# Patient Record
Sex: FEMALE | Race: WHITE | NOT HISPANIC OR LATINO | Employment: FULL TIME | URBAN - METROPOLITAN AREA
[De-identification: names, ages, dates, MRNs, and addresses within clinical notes are randomized per-mention and may not be internally consistent; named-entity substitution may affect disease eponyms.]

---

## 2017-02-24 ENCOUNTER — TRANSCRIBE ORDERS (OUTPATIENT)
Dept: ADMINISTRATIVE | Facility: HOSPITAL | Age: 47
End: 2017-02-24

## 2017-02-24 ENCOUNTER — HOSPITAL ENCOUNTER (OUTPATIENT)
Dept: GENERAL RADIOLOGY | Facility: HOSPITAL | Age: 47
Discharge: HOME | End: 2017-02-24
Attending: FAMILY MEDICINE | Admitting: FAMILY MEDICINE

## 2017-02-24 DIAGNOSIS — M25.522 LEFT ELBOW PAIN: Primary | ICD-10-CM

## 2017-02-24 DIAGNOSIS — M25.522 LEFT ELBOW PAIN: ICD-10-CM

## 2017-02-24 DIAGNOSIS — S46.912S LEFT SHOULDER STRAIN, SEQUELA: ICD-10-CM

## 2017-02-24 PROCEDURE — 73070 X-RAY EXAM OF ELBOW: CPT

## 2017-02-24 PROCEDURE — 73030 X-RAY EXAM OF SHOULDER: CPT

## 2017-07-13 ENCOUNTER — TRANSCRIBE ORDERS (OUTPATIENT)
Dept: ADMINISTRATIVE | Facility: HOSPITAL | Age: 47
End: 2017-07-13

## 2017-07-13 DIAGNOSIS — Z12.31 VISIT FOR SCREENING MAMMOGRAM: Primary | ICD-10-CM

## 2017-07-25 ENCOUNTER — HOSPITAL ENCOUNTER (OUTPATIENT)
Dept: MAMMOGRAPHY | Facility: HOSPITAL | Age: 47
Discharge: HOME | End: 2017-07-25
Attending: FAMILY MEDICINE | Admitting: FAMILY MEDICINE

## 2017-07-25 DIAGNOSIS — Z12.31 VISIT FOR SCREENING MAMMOGRAM: ICD-10-CM

## 2017-07-25 PROCEDURE — 77067 SCR MAMMO BI INCL CAD: CPT | Performed by: RADIOLOGY

## 2017-07-25 PROCEDURE — 77063 BREAST TOMOSYNTHESIS BI: CPT | Performed by: RADIOLOGY

## 2017-07-25 PROCEDURE — 77063 BREAST TOMOSYNTHESIS BI: CPT

## 2017-07-25 PROCEDURE — G0202 SCR MAMMO BI INCL CAD: HCPCS

## 2018-03-26 ENCOUNTER — TRANSCRIBE ORDERS (OUTPATIENT)
Dept: LAB | Facility: HOSPITAL | Age: 48
End: 2018-03-26

## 2018-03-26 ENCOUNTER — LAB (OUTPATIENT)
Dept: LAB | Facility: HOSPITAL | Age: 48
End: 2018-03-26

## 2018-03-26 DIAGNOSIS — E21.0 PRIMARY HYPERPARATHYROIDISM (HCC): Primary | ICD-10-CM

## 2018-03-26 DIAGNOSIS — E21.0 PRIMARY HYPERPARATHYROIDISM (HCC): ICD-10-CM

## 2018-03-26 LAB
ALBUMIN SERPL-MCNC: 4.3 G/DL (ref 3.2–4.8)
ALBUMIN/GLOB SERPL: 2 G/DL (ref 1.5–2.5)
ALP SERPL-CCNC: 314 U/L (ref 25–100)
ALT SERPL W P-5'-P-CCNC: 27 U/L (ref 7–40)
ANION GAP SERPL CALCULATED.3IONS-SCNC: 4 MMOL/L (ref 3–11)
AST SERPL-CCNC: 19 U/L (ref 0–33)
BILIRUB SERPL-MCNC: 0.4 MG/DL (ref 0.3–1.2)
BUN BLD-MCNC: 13 MG/DL (ref 9–23)
BUN/CREAT SERPL: 16.3 (ref 7–25)
CALCIUM SPEC-SCNC: 9.2 MG/DL (ref 8.7–10.4)
CHLORIDE SERPL-SCNC: 109 MMOL/L (ref 99–109)
CO2 SERPL-SCNC: 27 MMOL/L (ref 20–31)
CREAT BLD-MCNC: 0.8 MG/DL (ref 0.6–1.3)
DEPRECATED RDW RBC AUTO: 43.7 FL (ref 37–54)
ERYTHROCYTE [DISTWIDTH] IN BLOOD BY AUTOMATED COUNT: 13 % (ref 11.3–14.5)
GFR SERPL CREATININE-BSD FRML MDRD: 76 ML/MIN/1.73
GLOBULIN UR ELPH-MCNC: 2.1 GM/DL
GLUCOSE BLD-MCNC: 111 MG/DL (ref 70–100)
HCT VFR BLD AUTO: 44 % (ref 34.5–44)
HGB BLD-MCNC: 13.6 G/DL (ref 11.5–15.5)
MCH RBC QN AUTO: 28.5 PG (ref 27–31)
MCHC RBC AUTO-ENTMCNC: 30.9 G/DL (ref 32–36)
MCV RBC AUTO: 92.1 FL (ref 80–99)
PLATELET # BLD AUTO: 255 10*3/MM3 (ref 150–450)
PMV BLD AUTO: 11.2 FL (ref 6–12)
POTASSIUM BLD-SCNC: 4.5 MMOL/L (ref 3.5–5.5)
PROT SERPL-MCNC: 6.4 G/DL (ref 5.7–8.2)
RBC # BLD AUTO: 4.78 10*6/MM3 (ref 3.89–5.14)
SODIUM BLD-SCNC: 140 MMOL/L (ref 132–146)
TSH SERPL DL<=0.05 MIU/L-ACNC: 1.44 MIU/ML (ref 0.35–5.35)
WBC NRBC COR # BLD: 7.95 10*3/MM3 (ref 3.5–10.8)

## 2018-03-26 PROCEDURE — 85027 COMPLETE CBC AUTOMATED: CPT

## 2018-03-26 PROCEDURE — 36415 COLL VENOUS BLD VENIPUNCTURE: CPT

## 2018-03-26 PROCEDURE — 80053 COMPREHEN METABOLIC PANEL: CPT

## 2018-03-26 PROCEDURE — 84443 ASSAY THYROID STIM HORMONE: CPT

## 2018-03-30 ENCOUNTER — TRANSCRIBE ORDERS (OUTPATIENT)
Dept: ADMINISTRATIVE | Facility: HOSPITAL | Age: 48
End: 2018-03-30

## 2018-03-30 DIAGNOSIS — E21.0 PRIMARY HYPERPARATHYROIDISM (HCC): Primary | ICD-10-CM

## 2018-04-10 ENCOUNTER — HOSPITAL ENCOUNTER (OUTPATIENT)
Dept: ULTRASOUND IMAGING | Facility: HOSPITAL | Age: 48
Discharge: HOME | End: 2018-04-10
Attending: SURGERY

## 2018-04-10 ENCOUNTER — APPOINTMENT (OUTPATIENT)
Dept: NUCLEAR MEDICINE | Facility: HOSPITAL | Age: 48
End: 2018-04-10
Attending: SURGERY

## 2018-04-20 ENCOUNTER — HOSPITAL ENCOUNTER (OUTPATIENT)
Dept: ULTRASOUND IMAGING | Facility: HOSPITAL | Age: 48
Discharge: HOME | End: 2018-04-20
Attending: SURGERY | Admitting: SURGERY

## 2018-04-20 ENCOUNTER — HOSPITAL ENCOUNTER (OUTPATIENT)
Dept: NUCLEAR MEDICINE | Facility: HOSPITAL | Age: 48
Discharge: HOME | End: 2018-04-20
Attending: SURGERY

## 2018-04-20 DIAGNOSIS — E21.0 PRIMARY HYPERPARATHYROIDISM (HCC): ICD-10-CM

## 2018-04-20 PROCEDURE — 78070 PARATHYROID PLANAR IMAGING: CPT

## 2018-04-20 PROCEDURE — A9500 TC99M SESTAMIBI: HCPCS | Performed by: SURGERY

## 2018-04-20 PROCEDURE — 76536 US EXAM OF HEAD AND NECK: CPT

## 2018-04-20 PROCEDURE — 0 TECHNETIUM SESTAMIBI: Performed by: SURGERY

## 2018-04-20 RX ADMIN — TECHNETIUM TC 99M SESTAMIBI 1 DOSE: 1 INJECTION INTRAVENOUS at 13:05

## 2018-05-03 ENCOUNTER — TRANSCRIBE ORDERS (OUTPATIENT)
Dept: ADMINISTRATIVE | Facility: HOSPITAL | Age: 48
End: 2018-05-03

## 2018-05-03 DIAGNOSIS — E21.0 PRIMARY HYPERPARATHYROIDISM (HCC): Primary | ICD-10-CM

## 2018-05-08 ENCOUNTER — APPOINTMENT (OUTPATIENT)
Dept: ULTRASOUND IMAGING | Facility: HOSPITAL | Age: 48
End: 2018-05-08
Attending: SURGERY

## 2018-05-10 ENCOUNTER — TELEPHONE (OUTPATIENT)
Dept: NEUROSURGERY | Facility: CLINIC | Age: 48
End: 2018-05-10

## 2018-05-14 ENCOUNTER — APPOINTMENT (OUTPATIENT)
Dept: ULTRASOUND IMAGING | Facility: HOSPITAL | Age: 48
End: 2018-05-14
Attending: SURGERY

## 2018-05-14 ENCOUNTER — OFFICE VISIT (OUTPATIENT)
Dept: NEUROSURGERY | Facility: CLINIC | Age: 48
End: 2018-05-14

## 2018-05-14 VITALS — OXYGEN SATURATION: 98 % | RESPIRATION RATE: 18 BRPM | HEIGHT: 72 IN

## 2018-05-14 DIAGNOSIS — I67.1 CEREBROVASCULAR DURAL AV FISTULA: Primary | ICD-10-CM

## 2018-05-14 PROCEDURE — 99214 OFFICE O/P EST MOD 30 MIN: CPT | Performed by: NEUROLOGICAL SURGERY

## 2018-05-14 NOTE — PROGRESS NOTES
"  NAME: JEFF SIDHU   DOS: 2018  : 1970  PCP: Suzan Whitley MD    Chief Complaint:  Follow-up pulsatile tenderness  Chief Complaint   Patient presents with   • Dural AV Fistula       History of Present Illness:  47 y.o. female     This is a lady well-known to me for a torcular arterial venous fistula is undergone embolization on 2 separate occasions with reduction of the right-sided temporal pulsatile tinnitus but now she's had recurrence of left-sided symptomatology.  I set her up for a follow-up diagnostic angiogram a couple of years back however she was lost to follow-up given her \"busy life \".  She presented with a history of hyperparathyroidism was scheduled to undergo surgery for re-presented and they wish to get clearance for surgery and center back to me she still having pulsatile symptoms she has a significant dural fistula with retrograde venous drainage but has had no signs of hemorrhage.  PMHX  Allergies:  Allergies   Allergen Reactions   • Asa [Aspirin]      Irritates stomach     Medications    Current Outpatient Prescriptions:   •  acyclovir (ZOVIRAX) 400 MG tablet, Take 400 mg by mouth as needed. Take no more than 5 doses a day., Disp: , Rfl:   Past Medical History:  Past Medical History:   Diagnosis Date   • AV fistula     dural arterial fistula   • Headache    • History of femoral angiogram 2012    2 PRIOR ANGIOS/EMBOLIZATIONS   • Pulsatile tinnitus      Past Surgical History:  History reviewed. No pertinent surgical history.  Social Hx:  Social History   Substance Use Topics   • Smoking status: Never Smoker   • Smokeless tobacco: Never Used   • Alcohol use Yes      Comment: rare     Family Hx:  Family History   Problem Relation Age of Onset   • Colon cancer Paternal Grandmother 71   • Breast cancer Maternal Aunt 45   • Endometrial cancer Neg Hx    • Ovarian cancer Neg Hx    • BRCA 1/2 Neg Hx      Review of Systems:        Review of Systems   Constitutional: Negative for " activity change, appetite change, chills, diaphoresis, fatigue, fever and unexpected weight change.   HENT: Negative for congestion, dental problem, drooling, ear discharge, ear pain, facial swelling, hearing loss, mouth sores, nosebleeds, postnasal drip, rhinorrhea, sinus pressure, sneezing, sore throat, tinnitus, trouble swallowing and voice change.    Eyes: Negative for photophobia, pain, discharge, redness, itching and visual disturbance.   Respiratory: Negative for apnea, cough, choking, chest tightness, shortness of breath, wheezing and stridor.    Cardiovascular: Positive for leg swelling. Negative for chest pain and palpitations.   Gastrointestinal: Negative for abdominal distention, abdominal pain, anal bleeding, blood in stool, constipation, diarrhea, nausea, rectal pain and vomiting.   Endocrine: Negative for cold intolerance, heat intolerance, polydipsia, polyphagia and polyuria.   Genitourinary: Negative for decreased urine volume, difficulty urinating, dysuria, enuresis, flank pain, frequency, genital sores, hematuria and urgency.   Musculoskeletal: Negative for arthralgias, back pain, gait problem, joint swelling, myalgias, neck pain and neck stiffness.   Skin: Negative for color change, pallor, rash and wound.   Allergic/Immunologic: Negative for environmental allergies, food allergies and immunocompromised state.   Neurological: Positive for light-headedness. Negative for dizziness, tremors, seizures, syncope, facial asymmetry, speech difficulty, weakness, numbness and headaches.   Hematological: Negative for adenopathy. Bruises/bleeds easily.   Psychiatric/Behavioral: Negative for agitation, behavioral problems, confusion, decreased concentration, dysphoric mood, hallucinations, self-injury, sleep disturbance and suicidal ideas. The patient is not nervous/anxious and is not hyperactive.    All other systems reviewed and are negative.   I have reviewed this note template and all pertinent parts of  the review of systems social, family history, surgical history and medication list        Physical Examination:  Vitals:    05/14/18 1444   Resp: 18   SpO2: 98%      General Appearance:   Well developed, well nourished, well groomed, alert, and cooperative.  Neurological examination:  Neurologic Exam  Vital signs were reviewed and documented in the chart  Patient appeared in good neurologic function with normal comprehension fluent speech  Mood and affect are normal  Sense of smell deferred    Pupils symmetric equally reactive funduscopic exam not visualized   Visual fields intact to confrontation  Extraocular movements intact  Face motor function is symmetric  Facial sensations normal  Hearing intact to finger rub hearing intact to finger rub  Tongue is midline  Palate symmetric  Swallowing normal  Shoulder shrug normal    Muscle bulk and tone normal  5 out of 5 strength no motor drift  Gait normal intact  Negative Romberg  No clonus long tract signs or myelopathy  Machine like murmur left mastoidal area  Reflexes symmetric  No edema noted and extremities skin appears normal          Review of Imaging/DATA:  No recent imaging  Diagnoses/Plan:    Ms. Smith is a 47 y.o. female   She has a significant known dural fistula status post embolization a gave her the risks benefits and treatment met options from my standpoint I think she needs to be re-embolized get a diagnostic angiogram she wishes to do this under a total anesthesia which will allow us 1 opportunity to further look at her disease I've explained the risks benefits and expected outcome of repeat embolization she is aware of it I also explained to specifically and even offered secondary opinions given the complexity of this and the fact that venous sinuses sacrifice may be needed in order to control this fistula.

## 2018-05-17 ENCOUNTER — HOSPITAL ENCOUNTER (OUTPATIENT)
Dept: ULTRASOUND IMAGING | Facility: HOSPITAL | Age: 48
Discharge: HOME | End: 2018-05-17
Attending: SURGERY

## 2018-05-17 DIAGNOSIS — E21.0 PRIMARY HYPERPARATHYROIDISM (HCC): ICD-10-CM

## 2018-06-18 ENCOUNTER — ANESTHESIA EVENT (OUTPATIENT)
Dept: CARDIOLOGY | Facility: HOSPITAL | Age: 48
End: 2018-06-18

## 2018-06-19 ENCOUNTER — HOSPITAL ENCOUNTER (INPATIENT)
Facility: HOSPITAL | Age: 48
LOS: 1 days | Discharge: HOME | End: 2018-06-20
Attending: NEUROLOGICAL SURGERY | Admitting: RADIOLOGY

## 2018-06-19 ENCOUNTER — APPOINTMENT (OUTPATIENT)
Dept: MRI IMAGING | Facility: HOSPITAL | Age: 48
End: 2018-06-19

## 2018-06-19 ENCOUNTER — ANESTHESIA (OUTPATIENT)
Dept: CARDIOLOGY | Facility: HOSPITAL | Age: 48
End: 2018-06-19

## 2018-06-19 DIAGNOSIS — G95.0 SYRINX OF SPINAL CORD (HCC): Primary | ICD-10-CM

## 2018-06-19 DIAGNOSIS — I67.1 CEREBROVASCULAR DURAL AV FISTULA: ICD-10-CM

## 2018-06-19 PROBLEM — Z78.9 NON-SMOKER: Status: ACTIVE | Noted: 2018-06-19

## 2018-06-19 LAB
ANION GAP SERPL CALCULATED.3IONS-SCNC: 5 MMOL/L (ref 3–11)
B-HCG UR QL: NEGATIVE
BUN BLD-MCNC: 11 MG/DL (ref 9–23)
BUN/CREAT SERPL: 13.1 (ref 7–25)
CALCIUM SPEC-SCNC: 10.8 MG/DL (ref 8.7–10.4)
CHLORIDE SERPL-SCNC: 112 MMOL/L (ref 99–109)
CO2 SERPL-SCNC: 24 MMOL/L (ref 20–31)
CREAT BLD-MCNC: 0.84 MG/DL (ref 0.6–1.3)
DEPRECATED RDW RBC AUTO: 44.4 FL (ref 37–54)
ERYTHROCYTE [DISTWIDTH] IN BLOOD BY AUTOMATED COUNT: 13.6 % (ref 11.3–14.5)
GFR SERPL CREATININE-BSD FRML MDRD: 72 ML/MIN/1.73
GLUCOSE BLD-MCNC: 120 MG/DL (ref 70–100)
HCT VFR BLD AUTO: 43.8 % (ref 34.5–44)
HGB BLD-MCNC: 13.7 G/DL (ref 11.5–15.5)
MCH RBC QN AUTO: 28.1 PG (ref 27–31)
MCHC RBC AUTO-ENTMCNC: 31.3 G/DL (ref 32–36)
MCV RBC AUTO: 89.9 FL (ref 80–99)
PLATELET # BLD AUTO: 202 10*3/MM3 (ref 150–450)
PMV BLD AUTO: 10.2 FL (ref 6–12)
POTASSIUM BLD-SCNC: 4.2 MMOL/L (ref 3.5–5.5)
RBC # BLD AUTO: 4.87 10*6/MM3 (ref 3.89–5.14)
SODIUM BLD-SCNC: 141 MMOL/L (ref 132–146)
WBC NRBC COR # BLD: 4.35 10*3/MM3 (ref 3.5–10.8)

## 2018-06-19 PROCEDURE — 25010000002 PROPOFOL 10 MG/ML EMULSION: Performed by: NURSE ANESTHETIST, CERTIFIED REGISTERED

## 2018-06-19 PROCEDURE — 75894 X-RAYS TRANSCATH THERAPY: CPT | Performed by: RADIOLOGY

## 2018-06-19 PROCEDURE — 0 IODIXANOL PER 1 ML: Performed by: RADIOLOGY

## 2018-06-19 PROCEDURE — C1894 INTRO/SHEATH, NON-LASER: HCPCS | Performed by: RADIOLOGY

## 2018-06-19 PROCEDURE — 36217 PLACE CATHETER IN ARTERY: CPT | Performed by: RADIOLOGY

## 2018-06-19 PROCEDURE — 70546 MR ANGIOGRAPH HEAD W/O&W/DYE: CPT

## 2018-06-19 PROCEDURE — 61624 TCAT PERM OCCLS/EMBOLJ CNS: CPT | Performed by: RADIOLOGY

## 2018-06-19 PROCEDURE — 25010000002 DEXAMETHASONE PER 1 MG: Performed by: NURSE PRACTITIONER

## 2018-06-19 PROCEDURE — 99232 SBSQ HOSP IP/OBS MODERATE 35: CPT | Performed by: INTERNAL MEDICINE

## 2018-06-19 PROCEDURE — 81025 URINE PREGNANCY TEST: CPT | Performed by: NEUROLOGICAL SURGERY

## 2018-06-19 PROCEDURE — 36224 PLACE CATH CAROTD ART: CPT | Performed by: RADIOLOGY

## 2018-06-19 PROCEDURE — 36415 COLL VENOUS BLD VENIPUNCTURE: CPT

## 2018-06-19 PROCEDURE — C1769 GUIDE WIRE: HCPCS | Performed by: RADIOLOGY

## 2018-06-19 PROCEDURE — 75898 FOLLOW-UP ANGIOGRAPHY: CPT | Performed by: RADIOLOGY

## 2018-06-19 PROCEDURE — 80048 BASIC METABOLIC PNL TOTAL CA: CPT | Performed by: NEUROLOGICAL SURGERY

## 2018-06-19 PROCEDURE — 85027 COMPLETE CBC AUTOMATED: CPT | Performed by: NEUROLOGICAL SURGERY

## 2018-06-19 PROCEDURE — 36227 PLACE CATH XTRNL CAROTID: CPT | Performed by: RADIOLOGY

## 2018-06-19 PROCEDURE — 25010000002 PROMETHAZINE PER 50 MG: Performed by: PHYSICIAN ASSISTANT

## 2018-06-19 PROCEDURE — B31CYZZ FLUOROSCOPY OF BILATERAL EXTERNAL CAROTID ARTERIES USING OTHER CONTRAST: ICD-10-PCS | Performed by: NEUROLOGICAL SURGERY

## 2018-06-19 PROCEDURE — 25010000002 NEOSTIGMINE 10 MG/10ML SOLUTION: Performed by: NURSE ANESTHETIST, CERTIFIED REGISTERED

## 2018-06-19 PROCEDURE — 25010000002 ONDANSETRON PER 1 MG: Performed by: NURSE ANESTHETIST, CERTIFIED REGISTERED

## 2018-06-19 PROCEDURE — 25010000002 HYDRALAZINE PER 20 MG: Performed by: NURSE ANESTHETIST, CERTIFIED REGISTERED

## 2018-06-19 PROCEDURE — C1760 CLOSURE DEV, VASC: HCPCS | Performed by: RADIOLOGY

## 2018-06-19 PROCEDURE — 70551 MRI BRAIN STEM W/O DYE: CPT

## 2018-06-19 PROCEDURE — C1887 CATHETER, GUIDING: HCPCS | Performed by: RADIOLOGY

## 2018-06-19 PROCEDURE — 25010000002 HEPARIN (PORCINE) PER 1000 UNITS: Performed by: NURSE ANESTHETIST, CERTIFIED REGISTERED

## 2018-06-19 PROCEDURE — 25010000002 ONDANSETRON PER 1 MG: Performed by: NURSE PRACTITIONER

## 2018-06-19 PROCEDURE — 25010000002 DEXAMETHASONE PER 1 MG: Performed by: NURSE ANESTHETIST, CERTIFIED REGISTERED

## 2018-06-19 PROCEDURE — B318YZZ FLUOROSCOPY OF BILATERAL INTERNAL CAROTID ARTERIES USING OTHER CONTRAST: ICD-10-PCS | Performed by: NEUROLOGICAL SURGERY

## 2018-06-19 PROCEDURE — 03LG3DZ OCCLUSION OF INTRACRANIAL ARTERY WITH INTRALUMINAL DEVICE, PERCUTANEOUS APPROACH: ICD-10-PCS | Performed by: NEUROLOGICAL SURGERY

## 2018-06-19 PROCEDURE — 36226 PLACE CATH VERTEBRAL ART: CPT | Performed by: RADIOLOGY

## 2018-06-19 DEVICE — SYS DEL LIQ EMB ONYX 34 EVOH/8P VL1.5ML: Type: IMPLANTABLE DEVICE | Status: FUNCTIONAL

## 2018-06-19 DEVICE — SYS DEL LIQ EMB ONYX 18 EVOH/6PCT VL1.5ML: Type: IMPLANTABLE DEVICE | Status: FUNCTIONAL

## 2018-06-19 RX ORDER — SODIUM CHLORIDE, SODIUM LACTATE, POTASSIUM CHLORIDE, CALCIUM CHLORIDE 600; 310; 30; 20 MG/100ML; MG/100ML; MG/100ML; MG/100ML
INJECTION, SOLUTION INTRAVENOUS CONTINUOUS PRN
Status: DISCONTINUED | OUTPATIENT
Start: 2018-06-19 | End: 2018-06-19 | Stop reason: SURG

## 2018-06-19 RX ORDER — PROMETHAZINE HYDROCHLORIDE 25 MG/1
25 TABLET ORAL ONCE AS NEEDED
Status: DISCONTINUED | OUTPATIENT
Start: 2018-06-19 | End: 2018-06-19 | Stop reason: HOSPADM

## 2018-06-19 RX ORDER — DEXAMETHASONE SODIUM PHOSPHATE 4 MG/ML
4 INJECTION, SOLUTION INTRA-ARTICULAR; INTRALESIONAL; INTRAMUSCULAR; INTRAVENOUS; SOFT TISSUE EVERY 6 HOURS
Status: COMPLETED | OUTPATIENT
Start: 2018-06-19 | End: 2018-06-19

## 2018-06-19 RX ORDER — WATER 1000 ML/1000ML
INJECTION, SOLUTION INTRAVENOUS
Status: COMPLETED
Start: 2018-06-19 | End: 2018-06-19

## 2018-06-19 RX ORDER — NEOSTIGMINE METHYLSULFATE 1 MG/ML
INJECTION, SOLUTION INTRAVENOUS AS NEEDED
Status: DISCONTINUED | OUTPATIENT
Start: 2018-06-19 | End: 2018-06-19 | Stop reason: SURG

## 2018-06-19 RX ORDER — ONDANSETRON 2 MG/ML
4 INJECTION INTRAMUSCULAR; INTRAVENOUS ONCE AS NEEDED
Status: DISCONTINUED | OUTPATIENT
Start: 2018-06-19 | End: 2018-06-19 | Stop reason: HOSPADM

## 2018-06-19 RX ORDER — PROMETHAZINE HYDROCHLORIDE 25 MG/ML
12.5 INJECTION, SOLUTION INTRAMUSCULAR; INTRAVENOUS ONCE AS NEEDED
Status: DISCONTINUED | OUTPATIENT
Start: 2018-06-19 | End: 2018-06-19 | Stop reason: HOSPADM

## 2018-06-19 RX ORDER — DEXAMETHASONE SODIUM PHOSPHATE 4 MG/ML
INJECTION, SOLUTION INTRA-ARTICULAR; INTRALESIONAL; INTRAMUSCULAR; INTRAVENOUS; SOFT TISSUE AS NEEDED
Status: DISCONTINUED | OUTPATIENT
Start: 2018-06-19 | End: 2018-06-19 | Stop reason: SURG

## 2018-06-19 RX ORDER — IODIXANOL 320 MG/ML
INJECTION, SOLUTION INTRAVASCULAR AS NEEDED
Status: DISCONTINUED | OUTPATIENT
Start: 2018-06-19 | End: 2018-06-19 | Stop reason: HOSPADM

## 2018-06-19 RX ORDER — HYDRALAZINE HYDROCHLORIDE 20 MG/ML
INJECTION INTRAMUSCULAR; INTRAVENOUS AS NEEDED
Status: DISCONTINUED | OUTPATIENT
Start: 2018-06-19 | End: 2018-06-19 | Stop reason: SURG

## 2018-06-19 RX ORDER — FAMOTIDINE 20 MG/1
20 TABLET, FILM COATED ORAL
Status: DISCONTINUED | OUTPATIENT
Start: 2018-06-19 | End: 2018-06-19 | Stop reason: HOSPADM

## 2018-06-19 RX ORDER — PROMETHAZINE HYDROCHLORIDE 25 MG/ML
12.5 INJECTION, SOLUTION INTRAMUSCULAR; INTRAVENOUS ONCE
Status: COMPLETED | OUTPATIENT
Start: 2018-06-19 | End: 2018-06-19

## 2018-06-19 RX ORDER — GLYCOPYRROLATE 0.2 MG/ML
INJECTION INTRAMUSCULAR; INTRAVENOUS AS NEEDED
Status: DISCONTINUED | OUTPATIENT
Start: 2018-06-19 | End: 2018-06-19 | Stop reason: SURG

## 2018-06-19 RX ORDER — ONDANSETRON 2 MG/ML
4 INJECTION INTRAMUSCULAR; INTRAVENOUS EVERY 6 HOURS PRN
Status: DISCONTINUED | OUTPATIENT
Start: 2018-06-19 | End: 2018-06-20 | Stop reason: HOSPADM

## 2018-06-19 RX ORDER — ROCURONIUM BROMIDE 10 MG/ML
INJECTION, SOLUTION INTRAVENOUS AS NEEDED
Status: DISCONTINUED | OUTPATIENT
Start: 2018-06-19 | End: 2018-06-19 | Stop reason: SURG

## 2018-06-19 RX ORDER — SODIUM CHLORIDE, SODIUM LACTATE, POTASSIUM CHLORIDE, CALCIUM CHLORIDE 600; 310; 30; 20 MG/100ML; MG/100ML; MG/100ML; MG/100ML
9 INJECTION, SOLUTION INTRAVENOUS CONTINUOUS PRN
Status: DISCONTINUED | OUTPATIENT
Start: 2018-06-19 | End: 2018-06-19 | Stop reason: HOSPADM

## 2018-06-19 RX ORDER — PROPOFOL 10 MG/ML
VIAL (ML) INTRAVENOUS AS NEEDED
Status: DISCONTINUED | OUTPATIENT
Start: 2018-06-19 | End: 2018-06-19 | Stop reason: SURG

## 2018-06-19 RX ORDER — WATER 1000 ML/1000ML
INJECTION, SOLUTION INTRAVENOUS
Status: DISPENSED
Start: 2018-06-19 | End: 2018-06-20

## 2018-06-19 RX ORDER — PROMETHAZINE HYDROCHLORIDE 25 MG/1
25 SUPPOSITORY RECTAL ONCE AS NEEDED
Status: DISCONTINUED | OUTPATIENT
Start: 2018-06-19 | End: 2018-06-19 | Stop reason: HOSPADM

## 2018-06-19 RX ORDER — LIDOCAINE HYDROCHLORIDE 10 MG/ML
INJECTION, SOLUTION EPIDURAL; INFILTRATION; INTRACAUDAL; PERINEURAL AS NEEDED
Status: DISCONTINUED | OUTPATIENT
Start: 2018-06-19 | End: 2018-06-19 | Stop reason: SURG

## 2018-06-19 RX ORDER — FENTANYL CITRATE 50 UG/ML
50 INJECTION, SOLUTION INTRAMUSCULAR; INTRAVENOUS
Status: DISCONTINUED | OUTPATIENT
Start: 2018-06-19 | End: 2018-06-19 | Stop reason: HOSPADM

## 2018-06-19 RX ORDER — SODIUM CHLORIDE 0.9 % (FLUSH) 0.9 %
1-10 SYRINGE (ML) INJECTION AS NEEDED
Status: DISCONTINUED | OUTPATIENT
Start: 2018-06-19 | End: 2018-06-20 | Stop reason: HOSPADM

## 2018-06-19 RX ORDER — ONDANSETRON 2 MG/ML
INJECTION INTRAMUSCULAR; INTRAVENOUS AS NEEDED
Status: DISCONTINUED | OUTPATIENT
Start: 2018-06-19 | End: 2018-06-19 | Stop reason: SURG

## 2018-06-19 RX ORDER — HYDROCODONE BITARTRATE AND ACETAMINOPHEN 7.5; 325 MG/1; MG/1
1 TABLET ORAL EVERY 6 HOURS PRN
Status: DISCONTINUED | OUTPATIENT
Start: 2018-06-19 | End: 2018-06-20 | Stop reason: HOSPADM

## 2018-06-19 RX ORDER — SODIUM CHLORIDE 9 MG/ML
100 INJECTION, SOLUTION INTRAVENOUS CONTINUOUS
Status: DISCONTINUED | OUTPATIENT
Start: 2018-06-19 | End: 2018-06-20 | Stop reason: HOSPADM

## 2018-06-19 RX ORDER — ATRACURIUM BESYLATE 10 MG/ML
INJECTION, SOLUTION INTRAVENOUS AS NEEDED
Status: DISCONTINUED | OUTPATIENT
Start: 2018-06-19 | End: 2018-06-19 | Stop reason: SURG

## 2018-06-19 RX ORDER — LIDOCAINE HYDROCHLORIDE 10 MG/ML
0.5 INJECTION, SOLUTION EPIDURAL; INFILTRATION; INTRACAUDAL; PERINEURAL ONCE AS NEEDED
Status: DISCONTINUED | OUTPATIENT
Start: 2018-06-19 | End: 2018-06-19 | Stop reason: HOSPADM

## 2018-06-19 RX ORDER — SODIUM CHLORIDE 0.9 % (FLUSH) 0.9 %
1-10 SYRINGE (ML) INJECTION AS NEEDED
Status: DISCONTINUED | OUTPATIENT
Start: 2018-06-19 | End: 2018-06-19 | Stop reason: HOSPADM

## 2018-06-19 RX ORDER — ACETAMINOPHEN 325 MG/1
650 TABLET ORAL EVERY 4 HOURS PRN
Status: DISCONTINUED | OUTPATIENT
Start: 2018-06-19 | End: 2018-06-20 | Stop reason: HOSPADM

## 2018-06-19 RX ORDER — HEPARIN SODIUM 1000 [USP'U]/ML
INJECTION, SOLUTION INTRAVENOUS; SUBCUTANEOUS AS NEEDED
Status: DISCONTINUED | OUTPATIENT
Start: 2018-06-19 | End: 2018-06-19 | Stop reason: SURG

## 2018-06-19 RX ADMIN — DEXAMETHASONE SODIUM PHOSPHATE 4 MG: 4 INJECTION, SOLUTION INTRAMUSCULAR; INTRAVENOUS at 14:36

## 2018-06-19 RX ADMIN — ONDANSETRON 4 MG: 2 INJECTION INTRAMUSCULAR; INTRAVENOUS at 12:04

## 2018-06-19 RX ADMIN — HEPARIN SODIUM 9000 UNITS: 1000 INJECTION, SOLUTION INTRAVENOUS; SUBCUTANEOUS at 09:32

## 2018-06-19 RX ADMIN — PROPOFOL 180 MG: 10 INJECTION, EMULSION INTRAVENOUS at 08:38

## 2018-06-19 RX ADMIN — LIDOCAINE HYDROCHLORIDE 50 MG: 10 INJECTION, SOLUTION EPIDURAL; INFILTRATION; INTRACAUDAL; PERINEURAL at 08:38

## 2018-06-19 RX ADMIN — DEXAMETHASONE SODIUM PHOSPHATE 4 MG: 4 INJECTION, SOLUTION INTRAMUSCULAR; INTRAVENOUS at 19:54

## 2018-06-19 RX ADMIN — FAMOTIDINE 20 MG: 20 TABLET, FILM COATED ORAL at 08:03

## 2018-06-19 RX ADMIN — NEOSTIGMINE METHYLSULFATE 2.5 MG: 1 INJECTION, SOLUTION INTRAVENOUS at 12:04

## 2018-06-19 RX ADMIN — PROMETHAZINE HYDROCHLORIDE 12.5 MG: 25 INJECTION INTRAMUSCULAR; INTRAVENOUS at 21:21

## 2018-06-19 RX ADMIN — ROCURONIUM BROMIDE 50 MG: 10 SOLUTION INTRAVENOUS at 08:38

## 2018-06-19 RX ADMIN — ATRACURIUM BESYLATE 10 MG: 10 INJECTION, SOLUTION INTRAVENOUS at 11:15

## 2018-06-19 RX ADMIN — ONDANSETRON 4 MG: 2 INJECTION INTRAMUSCULAR; INTRAVENOUS at 18:29

## 2018-06-19 RX ADMIN — PROMETHAZINE HYDROCHLORIDE 12.5 MG: 25 INJECTION INTRAMUSCULAR; INTRAVENOUS at 22:29

## 2018-06-19 RX ADMIN — GLYCOPYRROLATE 0.4 MG: 0.2 INJECTION INTRAMUSCULAR; INTRAVENOUS at 12:04

## 2018-06-19 RX ADMIN — WATER 20 ML: 1 INJECTION INTRAMUSCULAR; INTRAVENOUS; SUBCUTANEOUS at 22:38

## 2018-06-19 RX ADMIN — DEXAMETHASONE SODIUM PHOSPHATE 8 MG: 4 INJECTION, SOLUTION INTRAMUSCULAR; INTRAVENOUS at 08:45

## 2018-06-19 RX ADMIN — WATER: 1 INJECTION INTRAMUSCULAR; INTRAVENOUS; SUBCUTANEOUS at 21:39

## 2018-06-19 RX ADMIN — HYDRALAZINE HYDROCHLORIDE 10 MG: 20 INJECTION INTRAMUSCULAR; INTRAVENOUS at 12:10

## 2018-06-19 RX ADMIN — SODIUM CHLORIDE 100 ML/HR: 9 INJECTION, SOLUTION INTRAVENOUS at 12:38

## 2018-06-19 RX ADMIN — SODIUM CHLORIDE, POTASSIUM CHLORIDE, SODIUM LACTATE AND CALCIUM CHLORIDE: 600; 310; 30; 20 INJECTION, SOLUTION INTRAVENOUS at 08:00

## 2018-06-19 NOTE — ANESTHESIA PREPROCEDURE EVALUATION
Anesthesia Evaluation     Patient summary reviewed and Nursing notes reviewed   NPO Solid Status: > 8 hours  NPO Liquid Status: > 8 hours           Airway   Mallampati: II  TM distance: >3 FB  Neck ROM: full  No difficulty expected  Dental      Pulmonary    (-) COPD, asthma, shortness of breath, not a smoker  Cardiovascular     (-) past MI, CAD, dysrhythmias, angina, cardiac stents, CABG      Neuro/Psych  (+) headaches,     (-) seizures, TIA, CVA    ROS Comment: AV Malf  GI/Hepatic/Renal/Endo    (+) obesity,     (-) liver disease, no renal disease, diabetes, hypothyroidism    ROS Comment: Hyperparathyroidism     Musculoskeletal     Abdominal    Substance History      OB/GYN          Other        ROS/Med Hx Other:                 Anesthesia Plan    ASA 2     general   (Anti PONV )  intravenous induction   Anesthetic plan and risks discussed with patient.    Plan discussed with CRNA.

## 2018-06-19 NOTE — PROGRESS NOTES
Intensive Care Follow-up      LOS: 0 days     Ms. Roberta Smith, 47 y.o. female is followed for: Glycemic, Electrolyte, Respiratory, and Medical management     Subjective - Interval History     47-year-old white female moved to the intensive care unit for monitoring following a dural aneurysm embolization procedure.  This was accomplished without difficulty today and the patient is currently awake and alert with no complaints in the intensive care unit.    She is a nonsmoker.  Denies alcohol or narcotic use.    The patient's relevant past medical, surgical and social history were reviewed and updated in Epic as appropriate.     Objective     Infusions:    niCARdipine 5-15 mg/hr    sodium chloride 100 mL/hr Last Rate: 100 mL/hr (06/19/18 1238)     Medications:    dexamethasone 4 mg Intravenous Q6H     Intake/Output       06/18/18 0700 - 06/19/18 0659 06/19/18 0700 - 06/20/18 0659    Intake (ml) 0 1050    Output (ml) 0 600    Net (ml) 0 450    Last Weight  --  129 kg (283 lb 8 oz)        Vital Sign Min/Max for last 24 hours  Temp  Min: 97.2 °F (36.2 °C)  Max: 97.7 °F (36.5 °C)   BP  Min: 124/73  Max: 146/76   Pulse  Min: 91  Max: 107   Resp  Min: 14  Max: 18   SpO2  Min: 94 %  Max: 98 %   No Data Recorded        Physical Exam:   GENERAL: Awake, No distress   HEENT: No adenopathy or thyromegaly   LUNGS: Clear without wheezes or crackles   HEART: Regular rate and rhythm without murmurs   ABDOMEN: Obese, soft, nontender   EXTREMITIES: Right groin site without hematoma.  Good pulses.  No cyanosis   NEURO/PSYCH: Awake and alert.  Follows commands.  No focal deficit      Results from last 7 days  Lab Units 06/19/18  0735   WBC 10*3/mm3 4.35   HEMOGLOBIN g/dL 13.7   PLATELETS 10*3/mm3 202       Results from last 7 days  Lab Units 06/19/18  0735   SODIUM mmol/L 141   POTASSIUM mmol/L 4.2   CO2 mmol/L 24.0   BUN mg/dL 11   CREATININE mg/dL 0.84   GLUCOSE mg/dL 120*     Estimated Creatinine Clearance: 122.1 mL/min (by C-G  formula based on SCr of 0.84 mg/dL).              No results found for: LACTATE       Images: No images to review    I reviewed the patient's results and images.     Impression      Hospital Problem List     * (Principal)Cerebrovascular dural AV fistula (S/P embolization)    Non-smoker               Plan        Monitor in the ICU  BP, neuro checks follow-up  Plans per Dr. Lewis   I discussed the patient's findings and my recommendations with patient, family and nursing staff       JANIA Omalley MD  Pulmonary and Critical Care Medicine

## 2018-06-19 NOTE — PLAN OF CARE
Problem: Patient Care Overview  Goal: Plan of Care Review  Outcome: Ongoing (interventions implemented as appropriate)   06/19/18 9889   Coping/Psychosocial   Plan of Care Reviewed With patient;family   Plan of Care Review   Progress improving   OTHER   Outcome Summary Vitals stable. No cardene needed. Lepe pulled. Ambulated when bedrest up with no pain and no changes in vitals.
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
## 2018-06-19 NOTE — ANESTHESIA PROCEDURE NOTES
Airway  Urgency: elective    Airway not difficult    General Information and Staff    Patient location during procedure: OR  CRNA: LO BEAR    Indications and Patient Condition  Indications for airway management: airway protection    Preoxygenated: yes  MILS not maintained throughout  Mask difficulty assessment: 1 - vent by mask    Final Airway Details  Final airway type: endotracheal airway      Successful airway: ETT  Cuffed: yes   Successful intubation technique: direct laryngoscopy  Endotracheal tube insertion site: oral  Blade: Real  Blade size: #3  ETT size: 7.5 mm  Cormack-Lehane Classification: grade I - full view of glottis  Placement verified by: chest auscultation and capnometry   Measured from: lips  ETT to lips (cm): 20  Number of attempts at approach: 1    Additional Comments  Negative epigastric sounds, Breath sound equal bilaterally with symmetric chest rise and fall. Easy and atraumatic

## 2018-06-20 VITALS
HEART RATE: 79 BPM | WEIGHT: 283.5 LBS | OXYGEN SATURATION: 92 % | TEMPERATURE: 98.6 F | RESPIRATION RATE: 16 BRPM | SYSTOLIC BLOOD PRESSURE: 125 MMHG | BODY MASS INDEX: 38.4 KG/M2 | DIASTOLIC BLOOD PRESSURE: 57 MMHG | HEIGHT: 72 IN

## 2018-06-20 LAB
ANION GAP SERPL CALCULATED.3IONS-SCNC: 7 MMOL/L (ref 3–11)
BASOPHILS # BLD AUTO: 0 10*3/MM3 (ref 0–0.2)
BASOPHILS NFR BLD AUTO: 0 % (ref 0–1)
BUN BLD-MCNC: 9 MG/DL (ref 9–23)
BUN/CREAT SERPL: 12.5 (ref 7–25)
CALCIUM SPEC-SCNC: 10.4 MG/DL (ref 8.7–10.4)
CHLORIDE SERPL-SCNC: 115 MMOL/L (ref 99–109)
CO2 SERPL-SCNC: 21 MMOL/L (ref 20–31)
CREAT BLD-MCNC: 0.72 MG/DL (ref 0.6–1.3)
DEPRECATED RDW RBC AUTO: 45.6 FL (ref 37–54)
EOSINOPHIL # BLD AUTO: 0 10*3/MM3 (ref 0–0.3)
EOSINOPHIL NFR BLD AUTO: 0 % (ref 0–3)
ERYTHROCYTE [DISTWIDTH] IN BLOOD BY AUTOMATED COUNT: 14 % (ref 11.3–14.5)
GFR SERPL CREATININE-BSD FRML MDRD: 86 ML/MIN/1.73
GLUCOSE BLD-MCNC: 138 MG/DL (ref 70–100)
HCT VFR BLD AUTO: 40.4 % (ref 34.5–44)
HGB BLD-MCNC: 12.5 G/DL (ref 11.5–15.5)
IMM GRANULOCYTES # BLD: 0.02 10*3/MM3 (ref 0–0.03)
IMM GRANULOCYTES NFR BLD: 0.2 % (ref 0–0.6)
LYMPHOCYTES # BLD AUTO: 0.99 10*3/MM3 (ref 0.6–4.8)
LYMPHOCYTES NFR BLD AUTO: 9.9 % (ref 24–44)
MCH RBC QN AUTO: 27.7 PG (ref 27–31)
MCHC RBC AUTO-ENTMCNC: 30.9 G/DL (ref 32–36)
MCV RBC AUTO: 89.6 FL (ref 80–99)
MONOCYTES # BLD AUTO: 0.56 10*3/MM3 (ref 0–1)
MONOCYTES NFR BLD AUTO: 5.6 % (ref 0–12)
NEUTROPHILS # BLD AUTO: 8.43 10*3/MM3 (ref 1.5–8.3)
NEUTROPHILS NFR BLD AUTO: 84.5 % (ref 41–71)
PLATELET # BLD AUTO: 194 10*3/MM3 (ref 150–450)
PMV BLD AUTO: 11.2 FL (ref 6–12)
POTASSIUM BLD-SCNC: 4.1 MMOL/L (ref 3.5–5.5)
RBC # BLD AUTO: 4.51 10*6/MM3 (ref 3.89–5.14)
SODIUM BLD-SCNC: 143 MMOL/L (ref 132–146)
WBC NRBC COR # BLD: 9.98 10*3/MM3 (ref 3.5–10.8)

## 2018-06-20 PROCEDURE — 99238 HOSP IP/OBS DSCHRG MGMT 30/<: CPT | Performed by: RADIOLOGY

## 2018-06-20 PROCEDURE — 85025 COMPLETE CBC W/AUTO DIFF WBC: CPT | Performed by: NURSE PRACTITIONER

## 2018-06-20 PROCEDURE — A9577 INJ MULTIHANCE: HCPCS | Performed by: NEUROLOGICAL SURGERY

## 2018-06-20 PROCEDURE — 99232 SBSQ HOSP IP/OBS MODERATE 35: CPT | Performed by: INTERNAL MEDICINE

## 2018-06-20 PROCEDURE — 80048 BASIC METABOLIC PNL TOTAL CA: CPT | Performed by: NURSE PRACTITIONER

## 2018-06-20 PROCEDURE — 0 GADOBENATE DIMEGLUMINE 529 MG/ML SOLUTION: Performed by: NEUROLOGICAL SURGERY

## 2018-06-20 PROCEDURE — 25010000002 ONDANSETRON PER 1 MG: Performed by: NURSE PRACTITIONER

## 2018-06-20 RX ORDER — ONDANSETRON 4 MG/1
4 TABLET, FILM COATED ORAL EVERY 8 HOURS PRN
Qty: 15 TABLET | Refills: 0 | Status: SHIPPED | OUTPATIENT
Start: 2018-06-20 | End: 2018-07-12

## 2018-06-20 RX ADMIN — SODIUM CHLORIDE 100 ML/HR: 9 INJECTION, SOLUTION INTRAVENOUS at 00:20

## 2018-06-20 RX ADMIN — ONDANSETRON 4 MG: 2 INJECTION INTRAMUSCULAR; INTRAVENOUS at 10:19

## 2018-06-20 RX ADMIN — GADOBENATE DIMEGLUMINE 20 ML: 529 INJECTION, SOLUTION INTRAVENOUS at 00:00
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
## 2018-06-20 NOTE — NURSING NOTE
At 2100, the patient called out saying the room was spinning and that she had vomited. Neurologically she was completely intact. Zofran was administered at 1830 and could not be given again until 0030. The lights were turned down, cool wash cloths were applied, and ice chips were provided. At 2110, she vomited again. Melyssa Burch PA-C with neurosurgery was paged at 2112 and called back at 2113. She ordered 12.5mg of Phenergan and stated to call back in 45 minutes if her symptoms had not subsided.

## 2018-06-20 NOTE — PROGRESS NOTES
Clinical Nutrition Note      Patient Name: Roberta Smith  MRN: [id number]  Admission date: 6/19/2018      Reason for Assessment:  Multidisciplinary Rounds    Additional information obtained during MDR: RN reports pt doing well s/p embolization, eating; anticipate dc home later today.    Current diet: Diet Regular    Intervention:  Plan of care and goals reviewed    Monitor:  RD to follow per protocol      Bekah Miles RD  06/20/18 10:00 AM  Time: 20min

## 2018-06-20 NOTE — PLAN OF CARE
Problem: Patient Care Overview  Goal: Plan of Care Review  Outcome: Ongoing (interventions implemented as appropriate)   06/20/18 0454   Coping/Psychosocial   Plan of Care Reviewed With patient;spouse;family   Plan of Care Review   Progress improving   OTHER   Outcome Summary Neurologically intact throughout the shift. Patient nauseated last night with emesis x 3. Melyssa Burch PA-C with Neurosurgery was notified. Phenergan 12.5 mg ordered x 2. MRI with and without completed. Nausea and vomiting resolved. Vital signs stable.        Problem: Surgery Nonspecified (Adult)  Goal: Signs and Symptoms of Listed Potential Problems Will be Absent, Minimized or Managed (Surgery Nonspecified)  Outcome: Ongoing (interventions implemented as appropriate)   06/20/18 0454   Goal/Outcome Evaluation   Problems Assessed (Surgery) all   Problems Present (Surgery) postoperative nausea and vomiting       Problem: Stroke (Ischemic) (Adult)  Goal: Signs and Symptoms of Listed Potential Problems Will be Absent, Minimized or Managed (Stroke)  Outcome: Ongoing (interventions implemented as appropriate)   06/20/18 0454   Goal/Outcome Evaluation   Problems Assessed (Stroke (Ischemic)) all   Problems Assessed (Stroke (Ischemic)) none

## 2018-06-20 NOTE — PROGRESS NOTES
Intensive Care Follow-up      LOS: 1 day     Ms. Roberta Smith, 47 y.o. female is followed for: Glycemic, Electrolyte, Respiratory, and Medical management     Subjective - Interval History     No problems overnight  No significant headache  Eating well  Not requiring supplemental oxygen  Blood pressure under good control    The patient's relevant past medical, surgical and social history were reviewed and updated in Epic as appropriate.     Objective     Infusions:    niCARdipine 5-15 mg/hr    sodium chloride 100 mL/hr Last Rate: 100 mL/hr (06/20/18 0020)     Medications:    sterile water (preservative free)        Intake/Output       06/19/18 0700 - 06/20/18 0659 06/20/18 0700 - 06/21/18 0659    Intake (ml) 1706.3 0    Output (ml) 1800 --    Net (ml) -93.7 0    Last Weight  129 kg (283 lb 8 oz)  --        Vital Sign Min/Max for last 24 hours  Temp  Min: 97.2 °F (36.2 °C)  Max: 98.9 °F (37.2 °C)   BP  Min: 101/63  Max: 146/76   Pulse  Min: 61  Max: 107   Resp  Min: 16  Max: 20   SpO2  Min: 90 %  Max: 98 %   No Data Recorded        Physical Exam:   GENERAL: Awake, no distress   HEENT: No adenopathy or thyromegaly   LUNGS: Clear without wheezes or crackles   HEART: Regular rate and rhythm without murmurs   ABDOMEN: Soft, nontender   EXTREMITIES: Palpable pulses.  Right groin site without hematoma   NEURO/PSYCH: Awake and alert.  Follows commands.  Grossly intact      Results from last 7 days  Lab Units 06/20/18  0423 06/19/18  0735   WBC 10*3/mm3 9.98 4.35   HEMOGLOBIN g/dL 12.5 13.7   PLATELETS 10*3/mm3 194 202       Results from last 7 days  Lab Units 06/20/18  0423 06/19/18  0735   SODIUM mmol/L 143 141   POTASSIUM mmol/L 4.1 4.2   CO2 mmol/L 21.0 24.0   BUN mg/dL 9 11   CREATININE mg/dL 0.72 0.84   GLUCOSE mg/dL 138* 120*     Estimated Creatinine Clearance: 144.4 mL/min (by C-G formula based on SCr of 0.72 mg/dL).              No results found for: LACTATE       Images: No new imaging to review    I reviewed  the patient's results and images.     Impression      Hospital Problem List     * (Principal)Cerebrovascular dural AV fistula (S/P embolization)    Non-smoker               Plan        Labs are nominal  Blood sugar a little elevated   As for probable discharge home today per Dr. Lewis    Plan of care and goals reviewed with mulitdisciplinary team at daily rounds   I discussed the patient's findings and my recommendations with patient, family and nursing staff       JANIA Omalley MD  Pulmonary and Critical Care Medicine

## 2018-06-20 NOTE — DISCHARGE SUMMARY
PATIENT:  JEFF SIDHU  YOB: 1970  VISIT ID:  [id number]  PRIMARY CARE:  Suzan Whitley MD  ADMITTING PHYSICIAN:  Gene Lewis MD    DATE OF ADMISSION:  6/19/2018  DATE OF DISCHARGE:  06/20/18      ADMITTING DIAGNOSIS:  1.  Cerebral dural arterial-venous fistula  2.  Pulsatile tinnitus.    DISCHARGE DIAGNOSIS:  1.  Cerebral dural arterial-venous fistula  2.  Pulsatile tinnitus.  3.  Abnormal signal in the cervical spinal cord      PROCEDURES:  1.  Diagnostic cerebral angiography  2.  Intracranial embolization of dural arterial-venous fistula, under general endotracheal anesthesia  3.  MRI/MRA head    BRIEF HOSPITAL COURSE:  Ms. Sidhu is a 47 y.o. female well-known to the neuro interventional service, having undergone prior embolization for a large, complex right transverse sinus/torcular dural arterial-venous fistula in 2012.  She was lost to follow-up, but presented with increasing symptoms of pulsatile tinnitus and bruit.  She returned for diagnostic cerebral angiography and embolization on 6/19/2018 under general anesthesia.  This confirmed recurrent/residual large, complex right transverse sinus/torcula dural arterial-venous fistula with multiple feeders.  There was successful embolization of the fistula, with much better [place] penetration into the dural leaflet, resulting in an estimated greater than 90% reduction in fistulous flow.    She did experience some post--procedure nausea/vomiting, but otherwise was neurologically intact.  Ambulating without assistance.  Tolerating by mouth intake very well.  Speech was clear.  No facial droop or pronator drift.  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  She describes no unilateral weakness/numbness, no  difficulty swallowing, no myelopathic symptoms.  Again, I suspect that this represented venous congestion within the cervical spinal cord, and I anticipate that this will improve following this embolization.  She is scheduled follow-up with Dr. Lewis in approximately 3 weeks' time in the neurosurgical clinic, and we will have an MRI of the cervical spine (with without contrast) prior to clinic visit for further evaluation.      DISCHARGE MEDICATIONS:     Discharge Medications      New Medications      Instructions Start Date   ondansetron 4 MG tablet  Commonly known as:  ZOFRAN   4 mg, Oral, Every 8 Hours PRN         Continue These Medications      Instructions Start Date   acyclovir 400 MG tablet  Commonly known as:  ZOVIRAX   400 mg, Oral, As Needed, Take no more than 5 doses a day.               ACTIVITY:  No strenuous activity or heavy lifting for 5-7 days.    DIET:  Talar diet    FOLLOW UP:      Follow-up Information     Suzan Whitley MD Follow up.    Specialty:  Family Medicine  Contact information:  [address] [facility]  [address]  [facility] [id number] [phone]             Gene Lewis MD Follow up.    Specialty:  Neurosurgery  Why:  pt will see Dr. Lewis on July 12 @ 1:00. office will call pt with mri time  Contact information:  [address]  [address]  [facility] [id number] [phone]

## 2018-06-21 NOTE — PAYOR COMM NOTE
"Roberta Sidhu (47 y.o. Female)     Date of Birth Social Security Number Address Home Phone MRN    1970  [id number] [facility] [address] [phone] [id number]    [employer] Marital Status          P[employer]al        Admission Date Admission Type Admitting Provider Attending Provider Department, Room/Bed    6/19/18 Elective Gene Lewis MD  [facility] 2B ICU, N223/1    Discharge Date Discharge Disposition Discharge Destination        6/20/2018 Home or Self Care              Attending Provider:  (none)   Allergies:  Asa [Aspirin]    Isolation:  None   Infection:  None   Code Status:  Prior    Ht:  185.4 cm (73\")   Wt:  129 kg (283 lb 8 oz)    Admission Cmt:  None   Principal Problem:  Cerebrovascular dural AV fistula (S/P embolization) [I67.1]                 Active Insurance as of 6/19/2018     Primary Coverage     Payor Plan Insurance Group Employer/Plan Group    [employer] [facility] [id number]     Payor Plan Address Payor Plan Phone Number Effective From Effective To    [address] [phone] 1/1/2017     [facility] [id number]       Subscriber Name Subscriber Birth Date Member ID       ROBERTA SIDHU 1970 [id number]                 Emergency Contacts      (Rel.) Home Phone Work Phone Mobile Phone    Vinicius Sidhu (Spouse) -- -- [phone]               Discharge Summary      Merlin Rachel MD at 6/20/2018 10:03 AM          PATIENT:  ROBERTA SIDHU  YOB: 1970  VISIT ID:  [id number]  PRIMARY CARE:  Suzan Whitley MD  ADMITTING PHYSICIAN:  Gene Lewis MD    DATE OF ADMISSION:  6/19/2018  DATE OF DISCHARGE:  06/20/18      ADMITTING DIAGNOSIS:  1.  Cerebral dural arterial-venous fistula  2.  Pulsatile tinnitus.    DISCHARGE DIAGNOSIS:  1.  Cerebral dural arterial-venous fistula  2.  Pulsatile tinnitus.  3.  Abnormal signal in the cervical spinal cord      PROCEDURES:  1.  Diagnostic " cerebral angiography  2.  Intracranial embolization of dural arterial-venous fistula, under general endotracheal anesthesia  3.  MRI/MRA head    BRIEF HOSPITAL COURSE:  Ms. Smith is a 47 y.o. female well-known to the neuro interventional service, having undergone prior embolization for a large, complex right transverse sinus/torcular dural arterial-venous fistula in 2012.  She was lost to follow-up, but presented with increasing symptoms of pulsatile tinnitus and bruit.  She returned for diagnostic cerebral angiography and embolization on 6/19/2018 under general anesthesia.  This confirmed recurrent/residual large, complex right transverse sinus/torcula dural arterial-venous fistula with multiple feeders.  There was successful embolization of the fistula, with much better [place] penetration into the dural leaflet, resulting in an estimated greater than 90% reduction in fistulous flow.    She did experience some post--procedure nausea/vomiting, but otherwise was neurologically intact.  Ambulating without assistance.  Tolerating by mouth intake very well.  Speech was clear.  No facial droop or pronator drift.  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  She describes no unilateral weakness/numbness, no difficulty swallowing, no myelopathic symptoms.  Again, I suspect that this represented venous congestion within the cervical spinal cord, and I anticipate that this will improve following this embolization.  She is scheduled follow-up with Dr. Lewis in approximately 3 weeks' time in the neurosurgical clinic, and we will have an MRI of the cervical spine (with without contrast) prior to clinic visit for further evaluation.      DISCHARGE MEDICATIONS:     Discharge Medications      New  Medications      Instructions Start Date   ondansetron 4 MG tablet  Commonly known as:  ZOFRAN   4 mg, Oral, Every 8 Hours PRN         Continue These Medications      Instructions Start Date   acyclovir 400 MG tablet  Commonly known as:  ZOVIRAX   400 mg, Oral, As Needed, Take no more than 5 doses a day.               ACTIVITY:  No strenuous activity or heavy lifting for 5-7 days.    DIET:  Talar diet    FOLLOW UP:      Follow-up Information     Suzan Whitley MD Follow up.    Specialty:  Family Medicine  Contact information:  [address] [facility] [address]  [place] [id number] [phone]             Gene Lewis MD Follow up.    Specialty:  Neurosurgery  Why:  pt will see Dr. Lewis on July 12 @ 1:00. office will call pt with mri time  Contact information:  [address]   [address]  [place] [id number] [phone]                   Electronically signed by Merlin Rachel MD at 6/20/2018 10:09 AM

## 2018-07-12 ENCOUNTER — PREP FOR SURGERY (OUTPATIENT)
Dept: OTHER | Facility: HOSPITAL | Age: 48
End: 2018-07-12

## 2018-07-12 ENCOUNTER — HOSPITAL ENCOUNTER (OUTPATIENT)
Dept: MRI IMAGING | Facility: HOSPITAL | Age: 48
Discharge: HOME | End: 2018-07-12
Attending: RADIOLOGY | Admitting: RADIOLOGY

## 2018-07-12 ENCOUNTER — OFFICE VISIT (OUTPATIENT)
Dept: NEUROSURGERY | Facility: CLINIC | Age: 48
End: 2018-07-12

## 2018-07-12 VITALS
DIASTOLIC BLOOD PRESSURE: 68 MMHG | BODY MASS INDEX: 36.03 KG/M2 | WEIGHT: 266 LBS | TEMPERATURE: 98.7 F | SYSTOLIC BLOOD PRESSURE: 132 MMHG | HEIGHT: 72 IN

## 2018-07-12 DIAGNOSIS — G95.0 SYRINX OF SPINAL CORD (HCC): ICD-10-CM

## 2018-07-12 DIAGNOSIS — I67.1 CEREBROVASCULAR DURAL AV FISTULA: Primary | ICD-10-CM

## 2018-07-12 DIAGNOSIS — I67.1 CEREBROVASCULAR DURAL AV FISTULA: ICD-10-CM

## 2018-07-12 PROCEDURE — 99214 OFFICE O/P EST MOD 30 MIN: CPT | Performed by: NEUROLOGICAL SURGERY

## 2018-07-12 PROCEDURE — A9577 INJ MULTIHANCE: HCPCS | Performed by: RADIOLOGY

## 2018-07-12 PROCEDURE — 0 GADOBENATE DIMEGLUMINE 529 MG/ML SOLUTION: Performed by: RADIOLOGY

## 2018-07-12 PROCEDURE — 72156 MRI NECK SPINE W/O & W/DYE: CPT

## 2018-07-12 RX ORDER — SODIUM CHLORIDE 9 MG/ML
100 INJECTION, SOLUTION INTRAVENOUS CONTINUOUS
Status: CANCELLED | OUTPATIENT
Start: 2018-07-12

## 2018-07-12 RX ADMIN — GADOBENATE DIMEGLUMINE 15 ML: 529 INJECTION, SOLUTION INTRAVENOUS at 11:20

## 2018-07-12 NOTE — PROGRESS NOTES
NAME: JEFF SIDHU   DOS: 2018  : 1970  PCP: Suzan Whitley MD    Chief Complaint:  Follow-up dural fistula  Chief Complaint   Patient presents with   • spinal cord syrinx       History of Present Illness:  47 y.o. female   This is a 47-year-old female very well known to me for a complex dural fistula of the right transverse sinus she's undergone embolization and is here for follow-up    I'm happy to report that her pulsatile tinnitus is gone and she had terrible headaches after her procedure which have resolved she denies any other new neurologic issues    PMHX  Allergies:  Allergies   Allergen Reactions   • Asa [Aspirin] Other (See Comments)     Irritates stomach     Medications    Current Outpatient Prescriptions:   •  acyclovir (ZOVIRAX) 400 MG tablet, Take 400 mg by mouth As Needed (for fever blisters). Take no more than 5 doses a day. , Disp: , Rfl:   No current facility-administered medications for this visit.   Past Medical History:  Past Medical History:   Diagnosis Date   • AV fistula (CMS/HCC)     dural arterial fistula   • Headache    • History of femoral angiogram 2012    2 PRIOR ANGIOS/EMBOLIZATIONS   • Pulsatile tinnitus      Past Surgical History:  Past Surgical History:   Procedure Laterality Date   • ENDOMETRIAL ABLATION W/ NOVASURE     • INTERVENTIONAL RADIOLOGY PROCEDURE N/A 2018    Procedure: Coil Embolization;  Surgeon: Merlin Rachel MD;  Location: [facility] INVASIVE LOCATION;  Service: Interventional Radiology   • IR CEREBRAL ANEURYSM COILING     • LAPAROSCOPIC TUBAL LIGATION     • WRIST SURGERY       Social Hx:  Social History   Substance Use Topics   • Smoking status: Never Smoker   • Smokeless tobacco: Never Used   • Alcohol use Yes      Comment: rare     Family Hx:  Family History   Problem Relation Age of Onset   • Colon cancer Paternal Grandmother 71   • Breast cancer Maternal Aunt 45   • Endometrial cancer Neg Hx    • Ovarian cancer Neg Hx    • BRCA 1/2 Neg  Hx      Review of Systems:        Review of Systems   Constitutional: Negative for activity change, appetite change, chills, diaphoresis, fatigue, fever and unexpected weight change.   HENT: Negative for congestion, dental problem, drooling, ear discharge, ear pain, facial swelling, hearing loss, mouth sores, nosebleeds, postnasal drip, rhinorrhea, sinus pressure, sneezing, sore throat, tinnitus, trouble swallowing and voice change.    Eyes: Negative for photophobia, pain, discharge, redness, itching and visual disturbance.   Respiratory: Negative for apnea, cough, choking, chest tightness, shortness of breath, wheezing and stridor.    Cardiovascular: Positive for leg swelling. Negative for chest pain and palpitations.   Gastrointestinal: Negative for abdominal distention, abdominal pain, anal bleeding, blood in stool, constipation, diarrhea, nausea, rectal pain and vomiting.   Endocrine: Negative for cold intolerance, heat intolerance, polydipsia, polyphagia and polyuria.   Genitourinary: Negative for decreased urine volume, difficulty urinating, dysuria, enuresis, flank pain, frequency, genital sores, hematuria and urgency.   Musculoskeletal: Negative for arthralgias, back pain, gait problem, joint swelling, myalgias, neck pain and neck stiffness.   Skin: Negative for color change, pallor, rash and wound.   Allergic/Immunologic: Negative for environmental allergies, food allergies and immunocompromised state.   Neurological: Negative for dizziness, tremors, seizures, syncope, facial asymmetry, speech difficulty, weakness, light-headedness, numbness and headaches.   Hematological: Negative for adenopathy. Does not bruise/bleed easily.   Psychiatric/Behavioral: Negative for agitation, behavioral problems, confusion, decreased concentration, dysphoric mood, hallucinations, self-injury, sleep disturbance and suicidal ideas. The patient is not nervous/anxious and is not hyperactive.             Physical  Examination:  Vitals:    07/12/18 1340   BP: 132/68   Temp: 98.7 °F (37.1 °C)      General Appearance:   Well developed, well nourished, well groomed, alert, and cooperative.  Neurological examination:  Neurologic Exam  Vital signs were reviewed and documented in the chart  Patient appeared in good neurologic function with normal comprehension fluent speech  Mood and affect are normal  Sense of smell deferred    Pupils symmetric equally reactive funduscopic exam not visualized   Visual fields intact to confrontation  Extraocular movements intact  Face motor function is symmetric  Facial sensations normal  Hearing intact to finger rub hearing intact to finger rub  Tongue is midline  Palate symmetric  Swallowing normal  Shoulder shrug normal    Muscle bulk and tone normal  5 out of 5 strength no motor drift  Gait normal intact  Negative Romberg  No clonus long tract signs or myelopathy    Reflexes symmetric  Mild edema noted and extremities skin appears normal    Straight leg raise sign absent  No signs of intrinsic hip dysfunction  Back is without any lesions or abnormality  Feet are warm and well perfused she does have some pedal edema        Review of Imaging/DATA:  Her MRI of her cervical spine shows a syrinx of the cervical cord  Diagnoses/Plan:    Ms. Smith is a 47 y.o. female   My suspicion is is that she is doing well her cervical cord syrinx needs to be monitored she's recovering well and has normal visual function I discussed with her that she likely needs more treatment for this she does have patterns of retrograde venous drainage a suspect given her most recent angiography.  We'll make arrangements for this in November I think there is a small risk of hemorrhage between now and then I explained that we need to be persistent in trying to obliterate the fistula given its flow issues.

## 2018-07-18 ENCOUNTER — HOSPITAL ENCOUNTER (OUTPATIENT)
Dept: GENERAL RADIOLOGY | Facility: HOSPITAL | Age: 48
Discharge: HOME | End: 2018-07-18
Attending: FAMILY MEDICINE | Admitting: FAMILY MEDICINE

## 2018-07-18 ENCOUNTER — TRANSCRIBE ORDERS (OUTPATIENT)
Dept: ADMINISTRATIVE | Facility: HOSPITAL | Age: 48
End: 2018-07-18

## 2018-07-18 DIAGNOSIS — M25.511 RIGHT SHOULDER PAIN, UNSPECIFIED CHRONICITY: Primary | ICD-10-CM

## 2018-07-18 PROCEDURE — 73030 X-RAY EXAM OF SHOULDER: CPT

## 2018-07-26 ENCOUNTER — TRANSCRIBE ORDERS (OUTPATIENT)
Dept: ADMINISTRATIVE | Facility: HOSPITAL | Age: 48
End: 2018-07-26

## 2018-07-26 DIAGNOSIS — G89.29 CHRONIC RIGHT SHOULDER PAIN: Primary | ICD-10-CM

## 2018-07-26 DIAGNOSIS — M25.511 CHRONIC RIGHT SHOULDER PAIN: Primary | ICD-10-CM

## 2018-08-02 ENCOUNTER — HOSPITAL ENCOUNTER (OUTPATIENT)
Dept: MRI IMAGING | Facility: HOSPITAL | Age: 48
Discharge: HOME | End: 2018-08-02
Attending: FAMILY MEDICINE | Admitting: FAMILY MEDICINE

## 2018-08-02 DIAGNOSIS — G89.29 CHRONIC RIGHT SHOULDER PAIN: ICD-10-CM

## 2018-08-02 DIAGNOSIS — M25.511 CHRONIC RIGHT SHOULDER PAIN: ICD-10-CM

## 2018-08-02 PROCEDURE — 73221 MRI JOINT UPR EXTREM W/O DYE: CPT

## 2018-08-29 ENCOUNTER — TRANSCRIBE ORDERS (OUTPATIENT)
Dept: ADMINISTRATIVE | Facility: HOSPITAL | Age: 48
End: 2018-08-29

## 2018-08-29 DIAGNOSIS — Z12.31 VISIT FOR SCREENING MAMMOGRAM: Primary | ICD-10-CM

## 2018-09-19 ENCOUNTER — HOSPITAL ENCOUNTER (OUTPATIENT)
Dept: MAMMOGRAPHY | Facility: HOSPITAL | Age: 48
Discharge: HOME | End: 2018-09-19
Attending: FAMILY MEDICINE | Admitting: FAMILY MEDICINE

## 2018-09-19 DIAGNOSIS — Z12.31 VISIT FOR SCREENING MAMMOGRAM: ICD-10-CM

## 2018-09-19 PROCEDURE — 77063 BREAST TOMOSYNTHESIS BI: CPT | Performed by: RADIOLOGY

## 2018-09-19 PROCEDURE — 77067 SCR MAMMO BI INCL CAD: CPT

## 2018-09-19 PROCEDURE — 77067 SCR MAMMO BI INCL CAD: CPT | Performed by: RADIOLOGY

## 2018-09-19 PROCEDURE — 77063 BREAST TOMOSYNTHESIS BI: CPT

## 2018-09-26 DIAGNOSIS — I77.0 A-V FISTULA (HCC): Primary | ICD-10-CM

## 2019-03-01 ENCOUNTER — TELEPHONE (OUTPATIENT)
Dept: NEUROSURGERY | Facility: CLINIC | Age: 49
End: 2019-03-01

## 2019-03-01 NOTE — TELEPHONE ENCOUNTER
Provider:  Joshua  Caller: Roberta    Phone #:  [phone]  Surgery:  Coil Embolization  Surgery Date:  6/19/18  Last visit:   7/12/18  Next visit: n/a    YANNICK:         Reason for call:           Pt requesting clearance from Dr. Lewis to have a thyroidectomy. Please adv

## 2019-03-05 NOTE — TELEPHONE ENCOUNTER
Patient called again regarding her thyroid removal.  I told her that Dr. Lewis wanted to see her before surgery.  She is to call and schedule an apt.

## 2019-03-07 ENCOUNTER — OFFICE VISIT (OUTPATIENT)
Dept: NEUROSURGERY | Facility: CLINIC | Age: 49
End: 2019-03-07

## 2019-03-07 VITALS — HEIGHT: 72 IN | WEIGHT: 275.8 LBS | BODY MASS INDEX: 37.36 KG/M2 | RESPIRATION RATE: 17 BRPM

## 2019-03-07 DIAGNOSIS — G95.0 SYRINX OF SPINAL CORD (HCC): ICD-10-CM

## 2019-03-07 DIAGNOSIS — I67.1 CEREBROVASCULAR DURAL AV FISTULA: Primary | ICD-10-CM

## 2019-03-07 PROCEDURE — 99214 OFFICE O/P EST MOD 30 MIN: CPT | Performed by: NEUROLOGICAL SURGERY

## 2019-03-07 NOTE — PROGRESS NOTES
NAME: JEFF SIDHU   DOS: 3/7/2019  : 1970  PCP: Suzan Whitley MD    Chief Complaint:    Chief Complaint   Patient presents with   • Hx of Larry Emboli AV Fistula   • Clearance for thyroidectomy       History of Present Illness:  48 y.o. female   This is a 48-year-old lady well-known to me for history of a complex torcular dural arterial venous fistula as well as a cervical syrinx.  She has undergone embolization for this on 2 separate occasions.  Interestingly she has a familial history of DVT.  Her last angioembolization went well and there was significant obliteration associated with no evidence of residual tinnitus.  She did have some visual changes and headaches after that which went on to resolve.  Her quality of life is good.    She denies any symptoms currently of headache visual problems are tinnitus.  She has upcoming parathyroid and thyroid surgery by Dr. Chavez at  and is here to discuss that.    PMHX  Allergies:  Allergies   Allergen Reactions   • Asa [Aspirin] Other (See Comments)     Irritates stomach     Medications    Current Outpatient Medications:   •  acyclovir (ZOVIRAX) 400 MG tablet, Take 400 mg by mouth As Needed (for fever blisters). Take no more than 5 doses a day. , Disp: , Rfl:   Past Medical History:  Past Medical History:   Diagnosis Date   • AV fistula (CMS/HCC)     dural arterial fistula   • Headache    • History of femoral angiogram 2012    2 PRIOR ANGIOS/EMBOLIZATIONS   • Pulsatile tinnitus      Past Surgical History:  Past Surgical History:   Procedure Laterality Date   • ENDOMETRIAL ABLATION W/ NOVASURE     • INTERVENTIONAL RADIOLOGY PROCEDURE N/A 2018    Procedure: Coil Embolization;  Surgeon: Merlin Rachel MD;  Location: [facility] INVASIVE LOCATION;  Service: Interventional Radiology   • IR CEREBRAL ANEURYSM COILING  10/12/2012    Embolization of torcular dural AV Fistula- Dr. Lewis    • LAPAROSCOPIC TUBAL LIGATION     • WRIST SURGERY       Social  Hx:  Social History     Tobacco Use   • Smoking status: Never Smoker   • Smokeless tobacco: Never Used   Substance Use Topics   • Alcohol use: Yes     Comment: rare   • Drug use: No     Family Hx:  Family History   Problem Relation Age of Onset   • Colon cancer Paternal Grandmother 71   • Breast cancer Maternal Aunt 45   • Endometrial cancer Neg Hx    • Ovarian cancer Neg Hx    • BRCA 1/2 Neg Hx      Review of Systems:        Review of Systems   Constitutional: Negative for activity change, appetite change, chills, diaphoresis, fatigue, fever and unexpected weight change.   HENT: Negative for congestion, dental problem, drooling, ear discharge, ear pain, facial swelling, hearing loss, mouth sores, nosebleeds, postnasal drip, rhinorrhea, sinus pressure, sneezing, sore throat, tinnitus, trouble swallowing and voice change.    Eyes: Negative for photophobia, pain, discharge, redness, itching and visual disturbance.   Respiratory: Negative for apnea, cough, choking, chest tightness, shortness of breath, wheezing and stridor.    Cardiovascular: Negative for chest pain, palpitations and leg swelling.   Gastrointestinal: Negative for abdominal distention, abdominal pain, anal bleeding, blood in stool, constipation, diarrhea, nausea, rectal pain and vomiting.   Endocrine: Negative for cold intolerance, heat intolerance, polydipsia, polyphagia and polyuria.   Genitourinary: Negative for decreased urine volume, difficulty urinating, dysuria, enuresis, flank pain, frequency, genital sores, hematuria and urgency.   Musculoskeletal: Negative for arthralgias, back pain, gait problem, joint swelling, myalgias, neck pain and neck stiffness.   Skin: Negative for color change, pallor, rash and wound.   Allergic/Immunologic: Negative for environmental allergies, food allergies and immunocompromised state.   Neurological: Negative for dizziness, tremors, seizures, syncope, facial asymmetry, speech difficulty, weakness,  light-headedness, numbness and headaches.   Hematological: Negative for adenopathy. Does not bruise/bleed easily.   Psychiatric/Behavioral: Negative for agitation, behavioral problems, confusion, decreased concentration, dysphoric mood, hallucinations, self-injury, sleep disturbance and suicidal ideas. The patient is not nervous/anxious and is not hyperactive.    All other systems reviewed and are negative.       I have reviewed this note template and all pertinent parts of the review of systems social, family history, surgical history and medication list    Physical Examination:  Vitals:    03/07/19 0958   Resp: 17      General Appearance:   Well developed, well nourished, well groomed, alert, and cooperative.  Neurological examination:  Neurologic Exam  Vital signs were reviewed and documented in the chart  Patient appeared in good neurologic function with normal comprehension fluent speech  Mood and affect are normal  Sense of smell deferred    Pupils symmetric equally reactive funduscopic exam not visualized   Visual fields intact to confrontation  Extraocular movements intact  Face motor function is symmetric  Facial sensations normal  Hearing intact to finger rub hearing intact to finger rub  Tongue is midline  Palate symmetric  Swallowing normal  Shoulder shrug normal  She has no evidence of pulsatile bruits had a auscultation of the cranium  Muscle bulk and tone normal  5 out of 5 strength no motor drift  Gait normal intact  Negative Romberg  No clonus long tract signs or myelopathy    Reflexes symmetric  No edema noted and extremities skin appears normal          Review of Imaging/DATA:  She has no imaging today her old studies were reviewed  Diagnoses/Plan:    Ms. Smith is a 48 y.o. female   She carries with her a history of DVT and familial history of such.  Interestingly these are associated with the formation of dural fistulas.  She is undergone successful embolization with reduction in her pulsatile  tinnitus but I am suspicious that her fistula lives on given its complexity.    I discussed with her for 30 minutes that there is always going to be a small risk of hemorrhage as long as this exists I think the risk is low during surgery she needs goodrich to a maintain normotensive.  If there are any neurologic changes I would have a low threshold to get CT imaging to check for hemorrhage.    From my standpoint we have discussed potential repeat angiography at some point I think it is reasonable for her to get her surgery get an ophthalmologic exam and repeat some cranial imaging MRIs of the brain and cervical spine to monitor her cervical syrinx.  I will see her back in 90 days or sooner if symptoms prevail    She should be safe to undergo her surgery

## 2019-06-10 ENCOUNTER — HOSPITAL ENCOUNTER (OUTPATIENT)
Dept: MRI IMAGING | Facility: HOSPITAL | Age: 49
Discharge: HOME | End: 2019-06-10
Admitting: NEUROLOGICAL SURGERY

## 2019-06-10 ENCOUNTER — HOSPITAL ENCOUNTER (OUTPATIENT)
Dept: MRI IMAGING | Facility: HOSPITAL | Age: 49
Discharge: HOME | End: 2019-06-10

## 2019-06-10 DIAGNOSIS — I67.1 CEREBROVASCULAR DURAL AV FISTULA: ICD-10-CM

## 2019-06-10 DIAGNOSIS — G95.0 SYRINX OF SPINAL CORD (HCC): ICD-10-CM

## 2019-06-10 PROCEDURE — 0 GADOBENATE DIMEGLUMINE 529 MG/ML SOLUTION: Performed by: NEUROLOGICAL SURGERY

## 2019-06-10 PROCEDURE — 72156 MRI NECK SPINE W/O & W/DYE: CPT

## 2019-06-10 PROCEDURE — 70548 MR ANGIOGRAPHY NECK W/DYE: CPT

## 2019-06-10 PROCEDURE — 70546 MR ANGIOGRAPH HEAD W/O&W/DYE: CPT

## 2019-06-10 PROCEDURE — A9577 INJ MULTIHANCE: HCPCS | Performed by: NEUROLOGICAL SURGERY

## 2019-06-10 PROCEDURE — 82565 ASSAY OF CREATININE: CPT

## 2019-06-10 RX ADMIN — GADOBENATE DIMEGLUMINE 20 ML: 529 INJECTION, SOLUTION INTRAVENOUS at 17:45

## 2019-06-13 ENCOUNTER — OFFICE VISIT (OUTPATIENT)
Dept: NEUROSURGERY | Facility: CLINIC | Age: 49
End: 2019-06-13

## 2019-06-13 VITALS
WEIGHT: 281 LBS | TEMPERATURE: 98.9 F | DIASTOLIC BLOOD PRESSURE: 82 MMHG | SYSTOLIC BLOOD PRESSURE: 148 MMHG | BODY MASS INDEX: 38.06 KG/M2 | HEIGHT: 72 IN

## 2019-06-13 DIAGNOSIS — I67.1 CEREBROVASCULAR DURAL AV FISTULA: Primary | ICD-10-CM

## 2019-06-13 PROCEDURE — 99213 OFFICE O/P EST LOW 20 MIN: CPT | Performed by: NEUROLOGICAL SURGERY

## 2019-06-13 RX ORDER — LEVOTHYROXINE SODIUM 0.15 MG/1
150 TABLET ORAL DAILY
COMMUNITY

## 2019-06-13 NOTE — PROGRESS NOTES
NAME: JEFF SIDHU   DOS: 2019  : 1970  PCP: Suzan Whitley MD    Chief Complaint:    Chief Complaint   Patient presents with   • HX of Larry Emboli AV Fistula       History of Present Illness:  48 y.o. female   I saw this 48-year-old in neurosurgical consultation in follow-up she is well-known to me for a history of complex torcular AV fistula and she is undergone 3 separate embolizations the most recently in .  She is here for follow-up with no new symptoms  PMHX  Allergies:  Allergies   Allergen Reactions   • Asa [Aspirin] Other (See Comments)     Irritates stomach     Medications    Current Outpatient Medications:   •  acyclovir (ZOVIRAX) 400 MG tablet, Take 400 mg by mouth As Needed (for fever blisters). Take no more than 5 doses a day. , Disp: , Rfl:   •  levothyroxine (SYNTHROID, LEVOTHROID) 150 MCG tablet, Take 150 mcg by mouth Daily., Disp: , Rfl:   Past Medical History:  Past Medical History:   Diagnosis Date   • AV fistula (CMS/HCC)     dural arterial fistula   • Headache    • History of femoral angiogram 2012    2 PRIOR ANGIOS/EMBOLIZATIONS   • Pulsatile tinnitus      Past Surgical History:  Past Surgical History:   Procedure Laterality Date   • ENDOMETRIAL ABLATION W/ NOVASURE     • INTERVENTIONAL RADIOLOGY PROCEDURE N/A 2018    Procedure: Coil Embolization;  Surgeon: Merlin Rachel MD;  Location: [facility] INVASIVE LOCATION;  Service: Interventional Radiology   • IR CEREBRAL ANEURYSM COILING  10/12/2012    Embolization of torcular dural AV Fistula- Dr. Lewis    • LAPAROSCOPIC TUBAL LIGATION     • WRIST SURGERY       Social Hx:  Social History     Tobacco Use   • Smoking status: Never Smoker   • Smokeless tobacco: Never Used   Substance Use Topics   • Alcohol use: Yes     Comment: rare   • Drug use: No     Family Hx:  Family History   Problem Relation Age of Onset   • Colon cancer Paternal Grandmother 71   • Breast cancer Maternal Aunt 45   • Endometrial cancer  Neg Hx    • Ovarian cancer Neg Hx    • BRCA 1/2 Neg Hx      Review of Systems:        Review of Systems   Constitutional: Negative for activity change, appetite change, chills, diaphoresis, fatigue, fever and unexpected weight change.   HENT: Negative for congestion, dental problem, drooling, ear discharge, ear pain, facial swelling, hearing loss, mouth sores, nosebleeds, postnasal drip, rhinorrhea, sinus pressure, sneezing, sore throat, tinnitus, trouble swallowing and voice change.    Eyes: Negative for photophobia, pain, discharge, redness, itching and visual disturbance.   Respiratory: Negative for apnea, cough, choking, chest tightness, shortness of breath, wheezing and stridor.    Cardiovascular: Negative for chest pain, palpitations and leg swelling.   Gastrointestinal: Negative for abdominal distention, abdominal pain, anal bleeding, blood in stool, constipation, diarrhea, nausea, rectal pain and vomiting.   Endocrine: Negative for cold intolerance, heat intolerance, polydipsia, polyphagia and polyuria.   Genitourinary: Negative for decreased urine volume, difficulty urinating, dysuria, enuresis, flank pain, frequency, genital sores, hematuria and urgency.   Musculoskeletal: Negative for arthralgias, back pain, gait problem, joint swelling, myalgias, neck pain and neck stiffness.   Skin: Negative for color change, pallor, rash and wound.   Allergic/Immunologic: Negative for environmental allergies, food allergies and immunocompromised state.   Neurological: Negative for dizziness, tremors, seizures, syncope, facial asymmetry, speech difficulty, weakness, light-headedness, numbness and headaches.   Hematological: Negative for adenopathy. Does not bruise/bleed easily.   Psychiatric/Behavioral: Negative for agitation, behavioral problems, confusion, decreased concentration, dysphoric mood, hallucinations, self-injury, sleep disturbance and suicidal ideas. The patient is not nervous/anxious and is not  hyperactive.    All other systems reviewed and are negative.       I have reviewed this note template and all pertinent parts of the review of systems social, family history, surgical history and medication list    Physical Examination:  Vitals:    06/13/19 1503   BP: 148/82   Temp: 98.9 °F (37.2 °C)      General Appearance:   Well developed, well nourished, well groomed, alert, and cooperative.  Neurological examination:  Neurologic Exam  She is awake alert and follows commands    Visual fields are full face is symmetric speech is normal she has normal gait and station insight is normal she has no cranial bruits    Review of Imaging/DATA:  MRAs were reviewed as well as MRIs of the cervical spine there is resolution of the syrinx and decrease telangiectasia of the torcular filling on the MRA of the head  Diagnoses/Plan:    Ms. Smith is a 48 y.o. female   She is currently asymptomatic has no return of pulsatile tinnitus no visual symptoms and her syrinx in her neck is gone as well as there is decreased vascularity if it is not gone altogether on her MRA that is non-contrasted.  I explained to her that there is a small chance that there could be residual fistula given the complexity of this I put that risk at around 5% of regrowth given the absence of symptomatology I think is reasonable to monitor this we will see her back in a year to a 16 months to follow her up with an MRI of the brain and cervical spine to monitor for resolution of the syrinx as well as an MRA of the head.

## 2019-06-13 NOTE — PATIENT INSTRUCTIONS
Follow up 1 year with repeat scans.     Please call Kassy Lewis's Medical Assistant - If you need anything # [phone]

## 2019-06-14 LAB — CREAT BLDA-MCNC: 1 MG/DL (ref 0.6–1.3)

## 2021-02-26 ENCOUNTER — IMMUNIZATION (OUTPATIENT)
Dept: VACCINE CLINIC | Facility: HOSPITAL | Age: 51
End: 2021-02-26

## 2021-02-26 PROCEDURE — 91301 HC SARSCO02 VAC 100MCG/0.5ML IM: CPT | Performed by: INTERNAL MEDICINE

## 2021-02-26 PROCEDURE — 0011A: CPT | Performed by: INTERNAL MEDICINE

## 2021-03-26 ENCOUNTER — IMMUNIZATION (OUTPATIENT)
Dept: VACCINE CLINIC | Facility: HOSPITAL | Age: 51
End: 2021-03-26

## 2021-03-26 PROCEDURE — 91301 HC SARSCO02 VAC 100MCG/0.5ML IM: CPT | Performed by: INTERNAL MEDICINE

## 2021-03-26 PROCEDURE — 0012A: CPT | Performed by: INTERNAL MEDICINE

## 2021-04-07 ENCOUNTER — TELEPHONE (OUTPATIENT)
Dept: NEUROSURGERY | Facility: CLINIC | Age: 51
End: 2021-04-07

## 2021-04-07 DIAGNOSIS — G95.0 SYRINX (HCC): ICD-10-CM

## 2021-04-07 DIAGNOSIS — I67.1 CEREBROVASCULAR DURAL AV FISTULA: Primary | ICD-10-CM

## 2021-04-07 NOTE — TELEPHONE ENCOUNTER
"----- Message from Nery Herrera sent at 4/7/2021 10:09 AM EDT -----  Regarding: RE: 1-Year Followup (Joshua)  Please put in orders for patient's scans.  Per Dr. Lewis's June, 2019 note, he would like to see the patient back with \"MRI of the brain and cervical spine to monitor for resolution of the syrinx as well as an MRA of the head.\"   Thank you.         ----- Message -----  From: Kassy Chavira MA  Sent: 4/6/2021   1:27 PM EDT  To: Nery Herrera  Subject: FW: 1-Year Followup (Joshua)                     Joshua did not mention VF's. Pt can come for her appt with the MRA as long as she has an appointment to see the eye doctor.   Kassy Iraheta   ----- Message -----  From: Nery Herrera  Sent: 4/5/2021   5:22 PM EDT  To: Kassy Chavira MA  Subject: 1-Year Followup (Joshua)                         Pt did not come in last year for her followup due to Covid.  She is going to schedule an eye appt with Dr. Ayo Carpio, ophthalmologist, but asked if this needs to be done before she is seen by Dr. Lewis.  Does she need a visual fields as well as consult with Dr. Carpio?  Please advise.  Thank you.        "

## 2021-05-15 ENCOUNTER — HOSPITAL ENCOUNTER (OUTPATIENT)
Dept: MRI IMAGING | Facility: HOSPITAL | Age: 51
Discharge: HOME | End: 2021-05-15

## 2021-05-15 DIAGNOSIS — I67.1 CEREBROVASCULAR DURAL AV FISTULA: ICD-10-CM

## 2021-05-15 DIAGNOSIS — G95.0 SYRINX (HCC): ICD-10-CM

## 2021-05-15 PROCEDURE — 70546 MR ANGIOGRAPH HEAD W/O&W/DYE: CPT

## 2021-05-15 PROCEDURE — 70553 MRI BRAIN STEM W/O & W/DYE: CPT

## 2021-05-15 PROCEDURE — 72156 MRI NECK SPINE W/O & W/DYE: CPT

## 2021-05-15 PROCEDURE — A9577 INJ MULTIHANCE: HCPCS | Performed by: NEUROLOGICAL SURGERY

## 2021-05-15 PROCEDURE — 0 GADOBENATE DIMEGLUMINE 529 MG/ML SOLUTION: Performed by: NEUROLOGICAL SURGERY

## 2021-05-15 RX ADMIN — GADOBENATE DIMEGLUMINE 20 ML: 529 INJECTION, SOLUTION INTRAVENOUS at 13:14

## 2021-05-17 ENCOUNTER — OFFICE VISIT (OUTPATIENT)
Dept: NEUROSURGERY | Facility: CLINIC | Age: 51
End: 2021-05-17

## 2021-05-17 VITALS — HEIGHT: 72 IN | WEIGHT: 288 LBS | BODY MASS INDEX: 39.01 KG/M2 | RESPIRATION RATE: 15 BRPM

## 2021-05-17 DIAGNOSIS — I67.1 CEREBROVASCULAR DURAL AV FISTULA: Primary | ICD-10-CM

## 2021-05-17 PROCEDURE — 99213 OFFICE O/P EST LOW 20 MIN: CPT | Performed by: NEUROLOGICAL SURGERY

## 2021-05-17 NOTE — PROGRESS NOTES
NAME: JEFF SIDHU   DOS: 2021  : 1970  PCP: Suzan Whitley MD    Chief Complaint:    Chief Complaint   Patient presents with   • Hx of marlyn embolization for av fistula       History of Present Illness:  50 y.o. female   I saw this 50-year-old female with a history of embolization of a dural fistula very large and underwent a couple embolizations for the presence of pulsatile tinnitus.  She is done well she is had no visual progression no evidence of pulsatile tinnitus and is here for evaluation    PMHX  Allergies:  Allergies   Allergen Reactions   • Asa [Aspirin] Other (See Comments)     Irritates stomach     Medications    Current Outpatient Medications:   •  acyclovir (ZOVIRAX) 400 MG tablet, Take 400 mg by mouth As Needed (for fever blisters). Take no more than 5 doses a day. , Disp: , Rfl:   •  levothyroxine (SYNTHROID, LEVOTHROID) 150 MCG tablet, Take 150 mcg by mouth Daily., Disp: , Rfl:   Past Medical History:  Past Medical History:   Diagnosis Date   • AV fistula (CMS/HCC)     dural arterial fistula   • Headache    • History of femoral angiogram 2012    2 PRIOR ANGIOS/EMBOLIZATIONS   • Pulsatile tinnitus      Past Surgical History:  Past Surgical History:   Procedure Laterality Date   • ENDOMETRIAL ABLATION W/ NOVASURE     • INTERVENTIONAL RADIOLOGY PROCEDURE N/A 2018    Procedure: Coil Embolization;  Surgeon: Merlin Rachel MD;  Location: [facility] INVASIVE LOCATION;  Service: Interventional Radiology   • IR CEREBRAL ANEURYSM COILING  10/12/2012    Embolization of torcular dural AV Fistula- Dr. Lewis    • LAPAROSCOPIC TUBAL LIGATION     • THYROIDECTOMY  2019   • WRIST SURGERY       Social Hx:  Social History     Tobacco Use   • Smoking status: Never Smoker   • Smokeless tobacco: Never Used   Substance Use Topics   • Alcohol use: Yes     Comment: rare   • Drug use: No     Family Hx:  Family History   Problem Relation Age of Onset   • Colon cancer Paternal Grandmother 71    • Breast cancer Maternal Aunt 45   • Endometrial cancer Neg Hx    • Ovarian cancer Neg Hx    • BRCA 1/2 Neg Hx      Review of Systems:        Review of Systems   Constitutional: Negative for activity change, appetite change, chills, diaphoresis, fatigue, fever and unexpected weight change.   HENT: Negative for congestion, dental problem, drooling, ear discharge, ear pain, facial swelling, hearing loss, mouth sores, nosebleeds, postnasal drip, rhinorrhea, sinus pressure, sneezing, sore throat, tinnitus, trouble swallowing and voice change.    Eyes: Negative for photophobia, pain, discharge, redness, itching and visual disturbance.   Respiratory: Negative for apnea, cough, choking, chest tightness, shortness of breath, wheezing and stridor.    Cardiovascular: Negative for chest pain, palpitations and leg swelling.   Gastrointestinal: Negative for abdominal distention, abdominal pain, anal bleeding, blood in stool, constipation, diarrhea, nausea, rectal pain and vomiting.   Endocrine: Negative for cold intolerance, heat intolerance, polydipsia, polyphagia and polyuria.   Genitourinary: Negative for decreased urine volume, difficulty urinating, dysuria, enuresis, flank pain, frequency, genital sores, hematuria and urgency.   Musculoskeletal: Negative for arthralgias, back pain, gait problem, joint swelling, myalgias, neck pain and neck stiffness.   Skin: Negative for color change, pallor, rash and wound.   Allergic/Immunologic: Negative for environmental allergies, food allergies and immunocompromised state.   Neurological: Negative for dizziness, tremors, seizures, syncope, facial asymmetry, speech difficulty, weakness, light-headedness, numbness and headaches.   Hematological: Negative for adenopathy. Does not bruise/bleed easily.   Psychiatric/Behavioral: Negative for agitation, behavioral problems, confusion, decreased concentration, dysphoric mood, hallucinations, self-injury, sleep disturbance and suicidal  ideas. The patient is not nervous/anxious and is not hyperactive.    All other systems reviewed and are negative.           Physical Examination:  Vitals:    05/17/21 1528   Resp: 15      General Appearance:   Well developed, well nourished, well groomed, alert, and cooperative.  Neurological examination:  Neurologic Exam  She is wide awake alert follows commands    Pupils are symmetric    No motor drift    No long track signs clonus myelopathy    Her hair is full and is grown back and her gait is normal she has no evidence of Romberg's    No evidence of cranial bruits    Review of Imaging/DATA:  MRA was reviewed personally I reviewed these films demonstrated them to her as well as the angiogram and also discussed the case personally and reviewed the films with her neuroradiology face-to-face Dr. Boyer given it shows the resolution of the majority of the fistula there may be a component in the left transverse sinus is still opacifies that subtle looks less intense than last studies  Diagnoses/Plan:    Ms. Smith is a 50 y.o. female   Very significant complex dural fistula status post embolization with resolution of pulsatile tinnitus and visual issues as well as acceptable headaches.  I talked to her about the risk benefits expected outcome of additional diagnostic studies I think right now I did not see on her last angiogram any retrograde venous drainage there is always a slight increased risk of bleeding I explained that to her but right now think it is okay to defer further work-up until symptoms develop.  Additionally her MRA looks better today compared to her prior studies with less robust transverse sinus opacification.    I have set up a phone visit in 2 years to touch base with her if there is any symptoms we will move that back up with an MRA and we can contemplate diagnostic angiogram  I encouraged her to get her eye exam  Is been a pleasure to provide neurosurgical care

## 2021-06-15 ENCOUNTER — TELEPHONE (OUTPATIENT)
Dept: NEUROSURGERY | Facility: CLINIC | Age: 51
End: 2021-06-15

## 2021-06-15 NOTE — TELEPHONE ENCOUNTER
Provider: Joshua  Caller: Self  Time of call: [id number]     Phone #: [phone]   Surgery:    Surgery Date:    Last visit: 05/17/2021     Next visit: 2 years    YANNICK:         Reason for call:      Patient is calling regarding a letter for her school. She is currently enrolled in class at [employer]. She states that she has some short time test anxiety and stress. She is wondering if we would be able to provide her a letter stating she needs additional time to complete the test. She states that she feels like her BP evaluates during short timed tests. She states that the school can give her additional time with a MD statement. Diagnosis, date diagnosis, methods of current treatments, severity of condition, how effects her. ETC

## 2021-06-15 NOTE — TELEPHONE ENCOUNTER
Patient notified and said her PCP does not know anything about this.  She said she will wait until Dr. Lewis returns for his opinion.

## 2021-06-21 NOTE — TELEPHONE ENCOUNTER
Ok for letter per Dr. Lewis.     Pt aware that she can print this from my chart, I have also mailed her 2 additional copies.

## 2023-08-03 ENCOUNTER — OFFICE VISIT (OUTPATIENT)
Dept: OBSTETRICS AND GYNECOLOGY | Facility: CLINIC | Age: 53
End: 2023-08-03
Payer: COMMERCIAL

## 2023-08-03 VITALS
BODY MASS INDEX: 37.52 KG/M2 | HEIGHT: 72 IN | SYSTOLIC BLOOD PRESSURE: 126 MMHG | WEIGHT: 277 LBS | DIASTOLIC BLOOD PRESSURE: 80 MMHG

## 2023-08-03 DIAGNOSIS — Z12.11 SCREENING FOR COLON CANCER: ICD-10-CM

## 2023-08-03 DIAGNOSIS — Z01.419 ENCOUNTER FOR GYNECOLOGICAL EXAMINATION WITHOUT ABNORMAL FINDING: Primary | ICD-10-CM

## 2023-08-03 RX ORDER — MULTIVIT-MIN/IRON/FOLIC ACID/K 18-600-40
1 CAPSULE ORAL DAILY
COMMUNITY
Start: 2023-05-31

## 2023-08-03 RX ORDER — LEVOTHYROXINE SODIUM 137 UG/1
137 TABLET ORAL DAILY
COMMUNITY
Start: 2019-09-01

## 2023-08-03 RX ORDER — DAPAGLIFLOZIN 10 MG/1
1 TABLET, FILM COATED ORAL DAILY
COMMUNITY
Start: 2022-05-18

## 2023-08-04 ENCOUNTER — PATIENT ROUNDING (BHMG ONLY) (OUTPATIENT)
Dept: OBSTETRICS AND GYNECOLOGY | Facility: CLINIC | Age: 53
End: 2023-08-04
Payer: COMMERCIAL

## 2023-08-04 LAB — REF LAB TEST METHOD: NORMAL

## 2023-08-14 ENCOUNTER — TRANSCRIBE ORDERS (OUTPATIENT)
Dept: ADMINISTRATIVE | Facility: HOSPITAL | Age: 53
End: 2023-08-14
Payer: COMMERCIAL

## 2023-08-14 DIAGNOSIS — Z12.31 SCREENING MAMMOGRAM FOR BREAST CANCER: Primary | ICD-10-CM

## 2023-08-26 ENCOUNTER — HOSPITAL ENCOUNTER (OUTPATIENT)
Dept: MAMMOGRAPHY | Facility: HOSPITAL | Age: 53
Discharge: HOME | End: 2023-08-26
Admitting: NURSE PRACTITIONER
Payer: COMMERCIAL

## 2023-08-26 DIAGNOSIS — Z12.31 SCREENING MAMMOGRAM FOR BREAST CANCER: ICD-10-CM

## 2023-08-26 PROCEDURE — 77063 BREAST TOMOSYNTHESIS BI: CPT

## 2023-08-26 PROCEDURE — 77067 SCR MAMMO BI INCL CAD: CPT

## 2023-10-04 ENCOUNTER — HOSPITAL ENCOUNTER (OUTPATIENT)
Dept: ULTRASOUND IMAGING | Facility: HOSPITAL | Age: 53
Discharge: HOME | End: 2023-10-04
Payer: COMMERCIAL

## 2023-10-04 ENCOUNTER — TRANSCRIBE ORDERS (OUTPATIENT)
Dept: MAMMOGRAPHY | Facility: HOSPITAL | Age: 53
End: 2023-10-04
Payer: COMMERCIAL

## 2023-10-04 ENCOUNTER — HOSPITAL ENCOUNTER (OUTPATIENT)
Dept: MAMMOGRAPHY | Facility: HOSPITAL | Age: 53
Discharge: HOME | End: 2023-10-04
Payer: COMMERCIAL

## 2023-10-04 DIAGNOSIS — R92.8 ABNORMAL MAMMOGRAM: Primary | ICD-10-CM

## 2023-10-04 DIAGNOSIS — R92.8 ABNORMAL MAMMOGRAM OF LEFT BREAST: ICD-10-CM

## 2023-10-04 PROCEDURE — 77061 BREAST TOMOSYNTHESIS UNI: CPT | Performed by: RADIOLOGY

## 2023-10-04 PROCEDURE — 77065 DX MAMMO INCL CAD UNI: CPT | Performed by: RADIOLOGY

## 2023-10-04 PROCEDURE — 77065 DX MAMMO INCL CAD UNI: CPT

## 2023-10-04 PROCEDURE — 76642 ULTRASOUND BREAST LIMITED: CPT | Performed by: RADIOLOGY

## 2023-10-04 PROCEDURE — 76642 ULTRASOUND BREAST LIMITED: CPT

## 2023-10-04 PROCEDURE — G0279 TOMOSYNTHESIS, MAMMO: HCPCS

## 2024-01-09 ENCOUNTER — HOSPITAL ENCOUNTER (OUTPATIENT)
Dept: MAMMOGRAPHY | Facility: HOSPITAL | Age: 54
Discharge: HOME | End: 2024-01-09
Payer: COMMERCIAL

## 2024-01-09 ENCOUNTER — HOSPITAL ENCOUNTER (OUTPATIENT)
Dept: ULTRASOUND IMAGING | Facility: HOSPITAL | Age: 54
Discharge: HOME | End: 2024-01-09
Payer: COMMERCIAL

## 2024-01-09 DIAGNOSIS — R92.8 ABNORMAL MAMMOGRAM: ICD-10-CM

## 2024-01-09 PROCEDURE — 76942 ECHO GUIDE FOR BIOPSY: CPT | Performed by: RADIOLOGY

## 2024-01-09 PROCEDURE — 25010000002 LIDOCAINE 1 % SOLUTION: Performed by: NURSE PRACTITIONER

## 2024-01-09 PROCEDURE — 77065 DX MAMMO INCL CAD UNI: CPT | Performed by: RADIOLOGY

## 2024-01-09 PROCEDURE — 19000 PUNCTURE ASPIR CYST BREAST: CPT | Performed by: RADIOLOGY

## 2024-01-09 PROCEDURE — 76942 ECHO GUIDE FOR BIOPSY: CPT

## 2024-01-09 RX ORDER — LIDOCAINE HYDROCHLORIDE AND EPINEPHRINE 10; 10 MG/ML; UG/ML
10 INJECTION, SOLUTION INFILTRATION; PERINEURAL ONCE
Status: SHIPPED | OUTPATIENT
Start: 2024-01-09

## 2024-01-09 RX ORDER — LIDOCAINE HYDROCHLORIDE 10 MG/ML
5 INJECTION, SOLUTION INFILTRATION; PERINEURAL ONCE
Status: COMPLETED | OUTPATIENT
Start: 2024-01-09 | End: 2024-01-09

## 2024-01-09 RX ADMIN — Medication 3 ML: at 14:24

## 2024-08-20 ENCOUNTER — OFFICE VISIT (OUTPATIENT)
Dept: OBSTETRICS AND GYNECOLOGY | Facility: CLINIC | Age: 54
End: 2024-08-20
Payer: COMMERCIAL

## 2024-08-20 VITALS
SYSTOLIC BLOOD PRESSURE: 122 MMHG | DIASTOLIC BLOOD PRESSURE: 70 MMHG | BODY MASS INDEX: 37.57 KG/M2 | WEIGHT: 277.4 LBS | HEIGHT: 72 IN

## 2024-08-20 DIAGNOSIS — Z01.419 ENCOUNTER FOR GYNECOLOGICAL EXAMINATION WITHOUT ABNORMAL FINDING: Primary | ICD-10-CM

## 2024-08-20 PROCEDURE — 99396 PREV VISIT EST AGE 40-64: CPT | Performed by: NURSE PRACTITIONER

## 2024-08-20 NOTE — PROGRESS NOTES
Chief Complaint  Roberta Smith is a 53 y.o.  female presenting for Annual Exam    History of Present Illness  Olena is a 54yo woman, , here for annual gyn exam.  She is still sexually active with her .  Notes no dryness or discomfort.  No postmenopausal bleeding.  Her past surgical history includes, in part, a laparoscopic bilateral tubal sterilization and endometrial ablation.  Her medical history is significant for DVT, DM (last A1C 6.5), and hypothyroidism (compensated).    Her mammogram 10/4/23 was suspicious, and she recently has undergone an US guided cyst aspiration (24) which collapsed the cyst, and post-procedure mammogram demonstrated resolution of the original mammogram finding.    Last pap 8/3/23, neg  Cologuard 23, neg    The following portions of the patient's history were reviewed and updated as appropriate: allergies, current medications, past family history, past medical history, past social history, past surgical history, and problem list.    Allergies   Allergen Reactions    Asa [Aspirin] Other (See Comments)     Irritates stomach, patient states she gets lesions on her face due to the stomach upset when taking aspirin.         Current Outpatient Medications:     acyclovir (ZOVIRAX) 400 MG tablet, Take 400 mg by mouth As Needed (for fever blisters). Take no more than 5 doses a day. , Disp: , Rfl:     B Complex-C (SUPER B COMPLEX/VITAMIN C PO), Take 1 tablet by mouth Daily., Disp: , Rfl:     Cholecalciferol (Vitamin D) 50 MCG (2000 UT) capsule, Take 1 capsule by mouth Daily., Disp: , Rfl:     Farxiga 10 MG tablet, Take 10 mg by mouth Daily., Disp: , Rfl:     levothyroxine (SYNTHROID, LEVOTHROID) 137 MCG tablet, Take 1 tablet by mouth Daily., Disp: , Rfl:     metFORMIN (GLUCOPHAGE) 500 MG tablet, Take 1 tablet by mouth Every 12 (Twelve) Hours., Disp: , Rfl:     Current Facility-Administered Medications:     lidocaine 1% - EPINEPHrine 1:[id number] (XYLOCAINE W/EPI) 1  "%-1:745644 injection 10 mL, 10 mL, Injection, Once, Felicitas Marcano MD    Past Medical History:   Diagnosis Date    AV fistula     dural arterial fistula    Deep vein thrombosis     Diabetes mellitus     Disease of thyroid gland     Headache     History of femoral angiogram 2012    2 PRIOR ANGIOS/EMBOLIZATIONS    Pulsatile tinnitus         Past Surgical History:   Procedure Laterality Date    ENDOMETRIAL ABLATION W/ NOVASURE      INTERVENTIONAL RADIOLOGY PROCEDURE N/A 6/19/2018    Procedure: Coil Embolization;  Surgeon: Merlin Rachel MD;  Location: [facility] INVASIVE LOCATION;  Service: Interventional Radiology    IR CEREBRAL ANEURYSM COILING  10/12/2012    Embolization of torcular dural AV Fistula- Dr. Lewis     LAPAROSCOPIC TUBAL LIGATION      THYROIDECTOMY  03/2019    US GUIDED CYST ASPIRATION BREAST N/A 1/9/2024    WRIST SURGERY         Objective  /70   Ht 185.4 cm (73\")   Wt 126 kg (277 lb 6.4 oz)   LMP  (LMP Unknown) Comment: s/p endometrial ablation  Breastfeeding No   BMI 36.60 kg/m²     Physical Exam  Vitals and nursing note reviewed. Exam conducted with a chaperone present.   Constitutional:       General: She is not in acute distress.     Appearance: Normal appearance. She is obese. She is not ill-appearing.   HENT:      Head: Normocephalic.   Neck:      Thyroid: No thyroid mass or thyromegaly.   Cardiovascular:      Rate and Rhythm: Normal rate and regular rhythm.      Heart sounds: Normal heart sounds. No murmur heard.  Pulmonary:      Effort: Pulmonary effort is normal. No respiratory distress.      Breath sounds: Normal breath sounds.   Chest:   Breasts:     Right: No inverted nipple, mass or nipple discharge.      Left: No inverted nipple, mass or nipple discharge.   Abdominal:      Palpations: Abdomen is soft. There is no mass.      Tenderness: There is no abdominal tenderness.   Genitourinary:     General: Normal vulva.      Labia:         Right: No rash, tenderness or " lesion.         Left: No rash, tenderness or lesion.       Vagina: Normal. No vaginal discharge or erythema.      Cervix: No discharge, lesion or erythema.      Uterus: Not enlarged and not tender.       Adnexa:         Right: No mass or tenderness.          Left: No mass or tenderness.        Comments: She is just beginning to have slight atrophic erythema in the apex of vagina.  No abnormal discharge.   Anus appears wnl.  No rectal exam performed.  Lymphadenopathy:      Upper Body:      Right upper body: No supraclavicular or axillary adenopathy.      Left upper body: No supraclavicular or axillary adenopathy.   Skin:     General: Skin is warm and dry.   Neurological:      Mental Status: She is alert and oriented to person, place, and time.   Psychiatric:         Mood and Affect: Mood normal.         Behavior: Behavior normal.         Assessment/Plan   Diagnoses and all orders for this visit:    1. Encounter for gynecological examination without abnormal finding (Primary)    Only mild vaginal atrophy, asymptomatic.    Procedures    40 to 64: Counseling/Anticipatory Guidance Discussed: screenings, self-breast exam, and importance to let me know if needs some kind of Tx for atrophy.    Return in about 1 year (around 8/20/2025) for Annual physical.    Freida Whitaker, APRN  08/20/2024

## 2024-09-26 ENCOUNTER — TRANSCRIBE ORDERS (OUTPATIENT)
Dept: ADMINISTRATIVE | Facility: HOSPITAL | Age: 54
End: 2024-09-26
Payer: COMMERCIAL

## 2024-09-26 DIAGNOSIS — Z12.31 VISIT FOR SCREENING MAMMOGRAM: Primary | ICD-10-CM

## 2024-12-29 LAB
NCCN CRITERIA FLAG: NORMAL
TYRER CUZICK SCORE: 7.9

## 2025-01-13 ENCOUNTER — HOSPITAL ENCOUNTER (OUTPATIENT)
Dept: MAMMOGRAPHY | Facility: HOSPITAL | Age: 55
Discharge: HOME | End: 2025-01-13
Admitting: NURSE PRACTITIONER
Payer: COMMERCIAL

## 2025-01-13 DIAGNOSIS — Z12.31 VISIT FOR SCREENING MAMMOGRAM: ICD-10-CM

## 2025-01-13 PROCEDURE — 77063 BREAST TOMOSYNTHESIS BI: CPT

## 2025-01-13 PROCEDURE — 77067 SCR MAMMO BI INCL CAD: CPT

## 2025-01-14 ENCOUNTER — TELEPHONE (OUTPATIENT)
Dept: OBSTETRICS AND GYNECOLOGY | Facility: CLINIC | Age: 55
End: 2025-01-14
Payer: COMMERCIAL

## 2025-01-14 PROBLEM — Z01.419 NORMAL GYNECOLOGIC EXAMINATION: Status: ACTIVE | Noted: 2025-01-14

## 2025-01-14 NOTE — TELEPHONE ENCOUNTER
"  Caller: Roberta Smith \"Olena\"    Relationship: Self    Best call back number: [phone]    What was the call regarding: PT RETURNING A MISSED CALL     "

## (undated) DEVICE — 2 TIP STRAIGHT MICROCATHETER: Brand: EXCELSIOR SL-10

## (undated) DEVICE — STPCK 3/WY HP M/RA W/OFF/HNDL 1050PSI STRL

## (undated) DEVICE — Device

## (undated) DEVICE — ST ACC MICROPUNCTURE .018 TRANSLSS/SS/TP 5F/10CM 21G/7CM

## (undated) DEVICE — STPCK LP 1WY RA 200PSI

## (undated) DEVICE — INTRO SHEATH ART/FEM ENGAGE .038 5F12CM

## (undated) DEVICE — DETACH COIL

## (undated) DEVICE — NEURO  GUIDEWIRE WITH HYDROPHILIC COATING: Brand: SYNCHRO 10

## (undated) DEVICE — 3M™ BAIR HUGGER® UNDERBODY BLANKET, ADULT, 10 PER CASE 54500: Brand: BAIR HUGGER™

## (undated) DEVICE — ANGIO-SEAL EVOLUTION VASCULAR CLOSURE DEVICE: Brand: ANGIO-SEAL

## (undated) DEVICE — LEX NEURO ANGIOGRAPHY: Brand: MEDLINE INDUSTRIES, INC.

## (undated) DEVICE — NEURO GUIDEWIRE WITH HYDROPHILIC COATING: Brand: SYNCHRO 14

## (undated) DEVICE — TOTAL TRAY, 16FR 10ML SIL FOLEY, URN: Brand: MEDLINE

## (undated) DEVICE — CATH TEMPO 5F BER 100CM: Brand: TEMPO